# Patient Record
Sex: FEMALE | Race: WHITE
[De-identification: names, ages, dates, MRNs, and addresses within clinical notes are randomized per-mention and may not be internally consistent; named-entity substitution may affect disease eponyms.]

---

## 2018-05-07 ENCOUNTER — HOSPITAL ENCOUNTER (EMERGENCY)
Dept: HOSPITAL 47 - EC | Age: 63
Discharge: HOME | End: 2018-05-07
Payer: COMMERCIAL

## 2018-05-07 VITALS
SYSTOLIC BLOOD PRESSURE: 129 MMHG | RESPIRATION RATE: 18 BRPM | TEMPERATURE: 97.8 F | DIASTOLIC BLOOD PRESSURE: 70 MMHG | HEART RATE: 84 BPM

## 2018-05-07 DIAGNOSIS — M25.512: Primary | ICD-10-CM

## 2018-05-07 DIAGNOSIS — M25.552: ICD-10-CM
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
## 2018-05-07 DIAGNOSIS — Z79.899: ICD-10-CM

## 2018-05-07 DIAGNOSIS — Z79.891: ICD-10-CM

## 2018-05-07 DIAGNOSIS — Z98.890: ICD-10-CM

## 2018-05-07 PROCEDURE — 99284 EMERGENCY DEPT VISIT MOD MDM: CPT

## 2018-05-07 PROCEDURE — 72100 X-RAY EXAM L-S SPINE 2/3 VWS: CPT

## 2018-05-07 PROCEDURE — 73502 X-RAY EXAM HIP UNI 2-3 VIEWS: CPT
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
## 2019-11-05 ENCOUNTER — HOSPITAL ENCOUNTER (OUTPATIENT)
Dept: HOSPITAL 47 - WWCWWP | Age: 64
Discharge: HOME | End: 2019-11-05
Attending: OBSTETRICS & GYNECOLOGY
Payer: COMMERCIAL

## 2019-11-05 VITALS
DIASTOLIC BLOOD PRESSURE: 94 MMHG | SYSTOLIC BLOOD PRESSURE: 145 MMHG | TEMPERATURE: 98 F | RESPIRATION RATE: 18 BRPM | HEART RATE: 88 BPM

## 2019-11-05 VITALS — BODY MASS INDEX: 44.6 KG/M2

## 2019-11-05 DIAGNOSIS — Z12.31: Primary | ICD-10-CM

## 2019-11-05 PROCEDURE — 77067 SCR MAMMO BI INCL CAD: CPT

## 2019-11-05 PROCEDURE — 77063 BREAST TOMOSYNTHESIS BI: CPT

## 2019-11-05 NOTE — P.HPOB
History of Present Illness


H&P Date: 19


Chief Complaint: The patient is here for her routine gynecologic exam and ma

mmogram.


This is a 64-year-old  with an LMP of .  The patient is here to 

establish with this office.  The patient is without gynecologic complaints and 

denies any postmenopausal bleeding.








Review of Systems


The patient believes she has gained about 13 pounds over the past year.  She 

states her weight does fluctuate quite a bit during the year.  She states her ty

pical weight is 255 pounds.  She denies respiratory, cardiac, or GI problems.








Past Medical History


Past Medical History: Musculoskeletal Disorder, Thyroid Disorder


Additional Past Medical History / Comment(s): HERNIATED DISCS TO BACK-N/T RT LEG

AND FOOT, vertigo.  PAST GYN HISTORY: She has no history of STDs.  She used HRT 

from about 3759-8505.


History of Any Multi-Drug Resistant Organisms: None Reported


Past Surgical History: Back Surgery, Cholecystectomy, Orthopedic Surgery, To

nsillectomy


Additional Past Surgical History / Comment(s): [id number] (2nd, next after 

10yrs), D&C.


Past Anesthesia/Blood Transfusion Reactions: Previous Problems w/ Anesthesia, 

Motion Sickness, Postoperative Nausea & Vomiting (PONV)


Additional Past Anesthesia/Blood Transfusion Reaction / Comment(s): NEVER HAS 

HAD BLOOD TRANSFUSION


Past Psychological History: No Psychological Hx Reported


Smoking Status: Never smoker


Past Alcohol Use History: Rare (3 per year)


Past Drug Use History: None Reported


Additional History: She has been  since  and this is her third 

marriage.  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





- Past Family History


  ** Father


Family Medical History: Cancer, COPD, Myocardial Infarction (MI)


Additional Family Medical History / Comment(s): MULT MYELOMA-PASSED AWAY AT AGE 

84





  ** Mother


Family Medical History: Coronary Artery Disease (CAD), Diabetes Mellitus, 

Myocardial Infarction (MI)


Additional Family Medical History / Comment(s): LIVING AT AGE 81,HEART STENTS X 

5





Medications and Allergies


                                Home Medications











 Medication  Instructions  Recorded  Confirmed  Type


 


Gabapentin [Neurontin] 300 mg PO DAILY 14 History


 


Acetaminophen [Tylenol] 325 mg PO DAILY PRN 19 History


 


Ibuprofen [Motrin] 800 mg PO BID 19 History


 


Levothyroxine Sodium 100 mcg PO DAILY 19 History


 


Phentermine HCl [Adipex-P] 37.5 mg PO [id number] 19 History


 


buPROPion [Wellbutrin] 1 tab PO DAILY 19 History








                                    Allergies











Allergy/AdvReac Type Severity Reaction Status Date / Time


 


No Known Allergies Allergy   Verified 19 13:26














Exam


                                   Vital Signs











  Temp Pulse Resp BP Pulse Ox


 


 19 13:22  98.0 F  88  18  145/94  95








                                Intake and Output











 19





 22:59 06:59 14:59


 


Other:   


 


  Weight   121.563 kg











Repeat blood pressure 146/90.  Height 5 feet 5 inches, weight 268 pounds, BMI 

44.6.





This is a well-developed well-nourished heavyset white female who is alert and 

oriented times 3 in no acute distress.


HEENT: Within normal limits.


NECK: Supple without mass or thyromegaly.


CHEST AND LUNGS: Clear to auscultation.


HEART: Regular rate and rhythm.


BREASTS: Are without mass or discharge.


AXILLARY EXAM: Negative for adenopathy.


BACK: Negative for CVA tenderness.


ABDOMEN: Soft, obese, nontender, without palpable masses.


PELVIC EXAM: Normal external genitalia with mild atrophy. Cervix and vagina 

appear normal mild atrophy. There is no unusual discharge.  There is no evidence

 of prolapse.  The uterus is midposition, nongravid size and nontender. There 

are no palpable adnexal masses or tenderness.  Bimanual examination is somewhat 

limited secondary to her size.


RECTAL EXAM: Rectovaginal exam is negative for mass or tenderness and is 

negative for occult blood.


EXTREMITIES: Nontender.





IMPRESSION: 


1.  64-year-old menopausal female with normal gynecologic exam.


2.  Elevated blood pressure





PLAN: 


1.  Pap smear was performed.


2.  Self breast awareness was discussed with the patient.


3.  Screening mammogram will be done today.


4.  Osteoporosis prevention was discussed.  I have stressed the importance of 

adequate calcium, vitamin D and regular exercise.  Recommended amounts of 

calcium and vitamin D were also discussed.


5.  I have recommended that she check her own blood pressures on a regular basis

 and follow-up with Dr. Guerrero for blood pressure elevations.


6. She was advised to return in one year for her annual well woman exam.

## 2019-11-07 NOTE — MM
Reason for exam: screening  (asymptomatic).

Last mammogram was performed 3 years and 4 months ago.



History:

Patient is postmenopausal.

Family history of breast cancer in paternal aunt at age 60.

Took estrogen for 3 years beginning at age 55.  Took progesterone for 3 years 

beginning at age 55.



Physical Findings:

A clinical breast exam by your physician is recommended on an annual basis and 

results should be correlated with mammographic findings.



MG 3D Screening Mammo W/Cad

Bilateral CC and MLO view(s) were taken.

Prior study comparison: July 8, 2016, bilateral MG 3d screening mammo w/cad.  

January 10, 2014, bilateral digital screening mammo w/CAD.

There are scattered fibroglandular densities.  There is no discrete abnormality.  

No significant changes when compared with prior studies.





ASSESSMENT: Negative, BI-RAD 1



RECOMMENDATION:

Routine screening mammogram of both breasts in 1 year.

## 2019-11-12 NOTE — P.PN
Progress Note - Text


Progress Note Date: 11/12/19


OUTPATIENT FOLLOW-UP NOTE





TEST(S)/RESULTS: test results from 11/05/2019 include negative Pap smear and 

benign mammogram.


METHOD OF NOTIFICATION: the patient was notified by phone.


PATIENT COMMENTS: the patient is happy to hear these results.


DIAGNOSIS: negative Pap smear and benign mammogram.


DISCUSSION: The patient states she had a bone density test done which was normal

in 2015.  I have recommended that she repeat the bone density testing in 2020.


PLAN: the patient is to return in one year for her annual well woman exam.  Bone

density test in 1 year.

## 2020-12-05 ENCOUNTER — HOSPITAL ENCOUNTER (INPATIENT)
Dept: HOSPITAL 47 - EC | Age: 65
LOS: 16 days | DRG: 208 | End: 2020-12-21
Attending: INTERNAL MEDICINE | Admitting: INTERNAL MEDICINE
Payer: COMMERCIAL

## 2020-12-05 VITALS — BODY MASS INDEX: 41.5 KG/M2

## 2020-12-05 DIAGNOSIS — E03.9: ICD-10-CM

## 2020-12-05 DIAGNOSIS — M54.9: ICD-10-CM

## 2020-12-05 DIAGNOSIS — I46.9: ICD-10-CM

## 2020-12-05 DIAGNOSIS — Z83.3: ICD-10-CM

## 2020-12-05 DIAGNOSIS — E66.01: ICD-10-CM

## 2020-12-05 DIAGNOSIS — J15.6: ICD-10-CM

## 2020-12-05 DIAGNOSIS — J80: ICD-10-CM

## 2020-12-05 DIAGNOSIS — I27.29: ICD-10-CM

## 2020-12-05 DIAGNOSIS — Z51.5: ICD-10-CM

## 2020-12-05 DIAGNOSIS — F32.9: ICD-10-CM

## 2020-12-05 DIAGNOSIS — Z82.5: ICD-10-CM

## 2020-12-05 DIAGNOSIS — Z98.890: ICD-10-CM

## 2020-12-05 DIAGNOSIS — G89.29: ICD-10-CM

## 2020-12-05 DIAGNOSIS — Z80.7: ICD-10-CM

## 2020-12-05 DIAGNOSIS — I95.9: ICD-10-CM

## 2020-12-05 DIAGNOSIS — Z90.89: ICD-10-CM

## 2020-12-05 DIAGNOSIS — J12.89: ICD-10-CM

## 2020-12-05 DIAGNOSIS — Z79.890: ICD-10-CM

## 2020-12-05 DIAGNOSIS — Z79.899: ICD-10-CM

## 2020-12-05 DIAGNOSIS — M51.26: ICD-10-CM

## 2020-12-05 DIAGNOSIS — Z82.49: ICD-10-CM

## 2020-12-05 DIAGNOSIS — Z90.49: ICD-10-CM

## 2020-12-05 DIAGNOSIS — U07.1: Primary | ICD-10-CM

## 2020-12-05 LAB
ALBUMIN SERPL-MCNC: 3.5 G/DL (ref 3.5–5)
ALP SERPL-CCNC: 83 U/L (ref 38–126)
ALT SERPL-CCNC: 65 U/L (ref 4–34)
ANION GAP SERPL CALC-SCNC: 5 MMOL/L
APTT BLD: 24.3 SEC (ref 22–30)
AST SERPL-CCNC: 76 U/L (ref 14–36)
BASOPHILS # BLD AUTO: 0.1 K/UL (ref 0–0.2)
BASOPHILS NFR BLD AUTO: 1 %
BUN SERPL-SCNC: 20 MG/DL (ref 7–17)
CALCIUM SPEC-MCNC: 8.7 MG/DL (ref 8.4–10.2)
CHLORIDE SERPL-SCNC: 102 MMOL/L (ref 98–107)
CO2 SERPL-SCNC: 28 MMOL/L (ref 22–30)
D DIMER PPP FEU-MCNC: 0.38 MG/L FEU (ref ?–0.6)
EOSINOPHIL # BLD AUTO: 0.1 K/UL (ref 0–0.7)
EOSINOPHIL NFR BLD AUTO: 1 %
ERYTHROCYTE [DISTWIDTH] IN BLOOD BY AUTOMATED COUNT: 4.96 M/UL (ref 3.8–5.4)
ERYTHROCYTE [DISTWIDTH] IN BLOOD: 13.3 % (ref 11.5–15.5)
FERRITIN SERPL-MCNC: 360.3 NG/ML (ref 10–291)
GLUCOSE SERPL-MCNC: 166 MG/DL (ref 74–99)
HCT VFR BLD AUTO: 45.2 % (ref 34–46)
HGB BLD-MCNC: 15.1 GM/DL (ref 11.4–16)
INR PPP: 0.9 (ref ?–1.2)
LDH SPEC-CCNC: 1038 U/L (ref 313–618)
LYMPHOCYTES # SPEC AUTO: 1.4 K/UL (ref 1–4.8)
LYMPHOCYTES NFR SPEC AUTO: 11 %
MAGNESIUM SPEC-SCNC: 2.1 MG/DL (ref 1.6–2.3)
MCH RBC QN AUTO: 30.4 PG (ref 25–35)
MCHC RBC AUTO-ENTMCNC: 33.4 G/DL (ref 31–37)
MCV RBC AUTO: 91 FL (ref 80–100)
MONOCYTES # BLD AUTO: 0.3 K/UL (ref 0–1)
MONOCYTES NFR BLD AUTO: 3 %
NEUTROPHILS # BLD AUTO: 10.6 K/UL (ref 1.3–7.7)
NEUTROPHILS NFR BLD AUTO: 84 %
PLATELET # BLD AUTO: 472 K/UL (ref 150–450)
POTASSIUM SERPL-SCNC: 4.6 MMOL/L (ref 3.5–5.1)
PROT SERPL-MCNC: 7 G/DL (ref 6.3–8.2)
PT BLD: 9.3 SEC (ref 9–12)
SODIUM SERPL-SCNC: 135 MMOL/L (ref 137–145)
WBC # BLD AUTO: 12.6 K/UL (ref 3.8–10.6)

## 2020-12-05 PROCEDURE — 85379 FIBRIN DEGRADATION QUANT: CPT

## 2020-12-05 PROCEDURE — 84145 PROCALCITONIN (PCT): CPT

## 2020-12-05 PROCEDURE — 36415 COLL VENOUS BLD VENIPUNCTURE: CPT

## 2020-12-05 PROCEDURE — 96375 TX/PRO/DX INJ NEW DRUG ADDON: CPT

## 2020-12-05 PROCEDURE — 96361 HYDRATE IV INFUSION ADD-ON: CPT

## 2020-12-05 PROCEDURE — 99285 EMERGENCY DEPT VISIT HI MDM: CPT

## 2020-12-05 PROCEDURE — 36600 WITHDRAWAL OF ARTERIAL BLOOD: CPT

## 2020-12-05 PROCEDURE — 94640 AIRWAY INHALATION TREATMENT: CPT

## 2020-12-05 PROCEDURE — 86850 RBC ANTIBODY SCREEN: CPT

## 2020-12-05 PROCEDURE — 85610 PROTHROMBIN TIME: CPT

## 2020-12-05 PROCEDURE — 83615 LACTATE (LD) (LDH) ENZYME: CPT

## 2020-12-05 PROCEDURE — 86900 BLOOD TYPING SEROLOGIC ABO: CPT

## 2020-12-05 PROCEDURE — 86901 BLOOD TYPING SEROLOGIC RH(D): CPT

## 2020-12-05 PROCEDURE — 85730 THROMBOPLASTIN TIME PARTIAL: CPT

## 2020-12-05 PROCEDURE — 96372 THER/PROPH/DIAG INJ SC/IM: CPT

## 2020-12-05 PROCEDURE — 96376 TX/PRO/DX INJ SAME DRUG ADON: CPT

## 2020-12-05 PROCEDURE — 82728 ASSAY OF FERRITIN: CPT

## 2020-12-05 PROCEDURE — 82805 BLOOD GASES W/O2 SATURATION: CPT

## 2020-12-05 PROCEDURE — 87635 SARS-COV-2 COVID-19 AMP PRB: CPT

## 2020-12-05 PROCEDURE — 85025 COMPLETE CBC W/AUTO DIFF WBC: CPT

## 2020-12-05 PROCEDURE — 93005 ELECTROCARDIOGRAM TRACING: CPT

## 2020-12-05 PROCEDURE — 83605 ASSAY OF LACTIC ACID: CPT

## 2020-12-05 PROCEDURE — 94760 N-INVAS EAR/PLS OXIMETRY 1: CPT

## 2020-12-05 PROCEDURE — 94003 VENT MGMT INPAT SUBQ DAY: CPT

## 2020-12-05 PROCEDURE — 71045 X-RAY EXAM CHEST 1 VIEW: CPT

## 2020-12-05 PROCEDURE — 87040 BLOOD CULTURE FOR BACTERIA: CPT

## 2020-12-05 PROCEDURE — 96374 THER/PROPH/DIAG INJ IV PUSH: CPT

## 2020-12-05 PROCEDURE — 94660 CPAP INITIATION&MGMT: CPT

## 2020-12-05 PROCEDURE — 80053 COMPREHEN METABOLIC PANEL: CPT

## 2020-12-05 PROCEDURE — 86140 C-REACTIVE PROTEIN: CPT

## 2020-12-05 PROCEDURE — 83735 ASSAY OF MAGNESIUM: CPT

## 2020-12-05 RX ADMIN — CEFAZOLIN SCH MLS/HR: 330 INJECTION, POWDER, FOR SOLUTION INTRAMUSCULAR; INTRAVENOUS at 19:11

## 2020-12-05 NOTE — XR
EXAMINATION TYPE: XR chest 1V portable

 

DATE OF EXAM: 12/5/2020

 

COMPARISON: 10/30/2015

 

HISTORY: Pneumonia. Short of breath.

 

TECHNIQUE:

 

FINDINGS: There is some interstitial infiltrate in both lungs with some coalescent density left lung 
base. Heart size is normal. There are no hilar masses. Mediastinum is normal. There is no pleural eff
usion.

 

IMPRESSION: There is new bilateral interstitial pneumonia compared to old exam.

## 2020-12-05 NOTE — ED
General Adult HPI





- General


Chief complaint: Shortness of Breath


Stated complaint: SOB


Time Seen by Provider: 12/05/20 16:55


Source: patient, RN notes reviewed, old records reviewed


Mode of arrival: wheelchair


Limitations: no limitations





- History of Present Illness


Initial comments: 





65-year-old female presents emergency department today with complaints of 

weakness and shortness of breath.  Patient reports that her entire family has 

cold that infection however her test was negative.  She states that she seems to

be the sickest with worsening difficulty breathing.  Patient has a at-home pulse

oximeter reportedly was 78 and one time today.  She went to the emergency 

department with a pulse ox of 85% on room air.  She reports that she has history

of bronchitis but is a nonsmoker.   Patient states that she has been seen by her

primary care doctor and prescribed azithromycin, hydroxychloroquine as well as 

steroids.  She reports despite taking his medications or symptoms seem to be 

worsening shortness of breath. 





- Related Data


                                Home Medications











 Medication  Instructions  Recorded  Confirmed


 


Ibuprofen [Motrin] 800 mg PO TID 11/05/19 12/05/20


 


Levothyroxine Sodium 100 mcg PO DAILY 11/05/19 12/05/20


 


Phentermine HCl [Adipex-P] 37.5 mg PO AC-BRKFST 11/05/19 12/05/20


 


Dexamethasone [Decadron] 6 mg PO DAILY 12/05/20 12/05/20


 


Hydroxychloroquine Sulfate See Taper PO AS DIRECTED 12/05/20 12/05/20





[Plaquenil]   


 


Neomycin/Polymyxin B/Dexametha 1 drop LEFT EYE QID 12/05/20 12/05/20





[Fgzgfd-Skgni-Opdeyxyg Eye Drop]   


 


buPROPion XL [Wellbutrin Xl] 150 mg PO DAILY 12/05/20 12/05/20











                                    Allergies











Allergy/AdvReac Type Severity Reaction Status Date / Time


 


No Known Allergies Allergy   Verified 12/05/20 18:37














Review of Systems


ROS Statement: 


Those systems with pertinent positive or pertinent negative responses have been 

documented in the HPI.





ROS Other: All systems not noted in ROS Statement are negative.





Past Medical History


Past Medical History: Musculoskeletal Disorder, Thyroid Disorder


Additional Past Medical History / Comment(s): HERNIATED DISCS TO BACK-N/T RT LEG

AND FOOT, vertigo.  PAST GYN HISTORY: She has no history of STDs.  She used HRT 

from about 0440-7879.


History of Any Multi-Drug Resistant Organisms: None Reported


Past Surgical History: Back Surgery, Cholecystectomy, Orthopedic Surgery, 

Tonsillectomy


Additional Past Surgical History / Comment(s): [id number] (2nd, next after 

10yrs), D&C.


Past Anesthesia/Blood Transfusion Reactions: Previous Problems w/ Anesthesia, 

Motion Sickness, Postoperative Nausea & Vomiting (PONV)


Additional Past Anesthesia/Blood Transfusion Reaction / Comment(s): NEVER HAS 

HAD BLOOD TRANSFUSION


Past Psychological History: No Psychological Hx Reported


Smoking Status: Never smoker


Past Alcohol Use History: Rare


Past Drug Use History: None Reported





- Past Family History


  ** Father


Family Medical History: Cancer, COPD, Myocardial Infarction (MI)


Additional Family Medical History / Comment(s): MULT MYELOMA-PASSED AWAY AT AGE 

84





  ** Mother


Family Medical History: Coronary Artery Disease (CAD), Diabetes Mellitus, 

Myocardial Infarction (MI)


Additional Family Medical History / Comment(s): LIVING AT AGE 81,HEART STENTS X 

5





General Exam





- General Exam Comments


Initial Comments: 





Is a 65-year-old female.  Patient appears in moderate discomfort  saturation was

85% on room air.  She is placed on 4 L of oxygen.


Limitations: no limitations


General appearance: alert, in no apparent distress


Head exam: Present: atraumatic, normocephalic, normal inspection


Eye exam: Present: normal appearance, PERRL, EOMI.  Absent: scleral icterus, 

conjunctival injection, periorbital swelling


ENT exam: Present: normal exam, mucous membranes moist


Neck exam: Present: normal inspection.  Absent: tenderness, meningismus, 

lymphadenopathy


Respiratory exam: Present: normal lung sounds bilaterally, wheezes.  Absent: 

respiratory distress, rales, rhonchi, stridor


Cardiovascular Exam: Present: regular rate, normal rhythm, normal heart sounds. 

Absent: systolic murmur, diastolic murmur, rubs, gallop, clicks


GI/Abdominal exam: Present: soft, normal bowel sounds.  Absent: distended, 

tenderness, guarding, rebound, rigid


Extremities exam: Present: normal inspection, full ROM, normal capillary refill.

 Absent: tenderness, pedal edema, joint swelling, calf tenderness


Back exam: Present: normal inspection


Neurological exam: Present: alert, oriented X3, CN II-XII intact


Psychiatric exam: Present: normal affect, normal mood


Skin exam: Present: warm, dry, intact, normal color.  Absent: rash





Course


                                   Vital Signs











  12/05/20 12/05/20





  16:47 19:38


 


Temperature 98.4 F 98.1 F


 


Pulse Rate 84 67


 


Respiratory 20 22





Rate  


 


Blood Pressure 129/83 138/79


 


O2 Sat by Pulse 88 L 95





Oximetry  














EKG Findings





- EKG Comments:


EKG Findings:: EKG performed at 1823 shows normal sinus rhythm.  Possible atrial

enlargement.  Left ventricular hypertrophy.  Ventricularly of 71 bpm.  Was 1:30 

milliseconds.  QRS duration is 84 ms.  QT QTc is 400/434 ms.





Medical Decision Making





- Medical Decision Making


65-year-old female presents emergency room today with fatigue shortness of b

reath.  She is not hypoxic with oxygen saturation 85% on room air.  She is 

placed on 4 L of oxygen has some improvement.  Chest x-ray shows bilateral 

pneumonia.  Her family tested positive for cocaine.  However her initial test 

was negative.  She will be retested today.  Patient's labs are otherwise 

reviewed d-dimer was normal.  Plantar markers are elevated.  Patient's case was 

discussed with Dr. Smith whom discussed the case with Dr. clayton to 

agrees for admission.  Patient will be admitted at this time with IV fluids and 

oxygen supplementation.  She was started on steroids and Rocephin.








- Lab Data


Result diagrams: 


                                 12/05/20 18:30





                                 12/05/20 18:30


                                   Lab Results











  12/05/20 12/05/20 12/05/20 Range/Units





  18:30 18:30 18:30 


 


WBC  12.6 H    (3.8-10.6)  k/uL


 


RBC  4.96    (3.80-5.40)  m/uL


 


Hgb  15.1    (11.4-16.0)  gm/dL


 


Hct  45.2    (34.0-46.0)  %


 


MCV  91.0    (80.0-100.0)  fL


 


MCH  30.4    (25.0-35.0)  pg


 


MCHC  33.4    (31.0-37.0)  g/dL


 


RDW  13.3    (11.5-15.5)  %


 


Plt Count  472 H    (150-450)  k/uL


 


MPV  6.3    


 


Neutrophils %  84    %


 


Lymphocytes %  11    %


 


Monocytes %  3    %


 


Eosinophils %  1    %


 


Basophils %  1    %


 


Neutrophils #  10.6 H    (1.3-7.7)  k/uL


 


Lymphocytes #  1.4    (1.0-4.8)  k/uL


 


Monocytes #  0.3    (0-1.0)  k/uL


 


Eosinophils #  0.1    (0-0.7)  k/uL


 


Basophils #  0.1    (0-0.2)  k/uL


 


PT   9.3   (9.0-12.0)  sec


 


INR   0.9   (<1.2)  


 


APTT   24.3   (22.0-30.0)  sec


 


D-Dimer   0.38   (<0.60)  mg/L FEU


 


Sodium    135 L  (137-145)  mmol/L


 


Potassium    4.6  (3.5-5.1)  mmol/L


 


Chloride    102  ()  mmol/L


 


Carbon Dioxide    28  (22-30)  mmol/L


 


Anion Gap    5  mmol/L


 


BUN    20 H  (7-17)  mg/dL


 


Creatinine    0.50 L  (0.52-1.04)  mg/dL


 


Est GFR (CKD-EPI)AfAm    >90  (>60 ml/min/1.73 sqM)  


 


Est GFR (CKD-EPI)NonAf    >90  (>60 ml/min/1.73 sqM)  


 


Glucose    166 H  (74-99)  mg/dL


 


Plasma Lactic Acid Nikco     (0.7-2.0)  mmol/L


 


Calcium    8.7  (8.4-10.2)  mg/dL


 


Magnesium    2.1  (1.6-2.3)  mg/dL


 


Total Bilirubin    0.4  (0.2-1.3)  mg/dL


 


AST    76 H  (14-36)  U/L


 


ALT    65 H  (4-34)  U/L


 


Alkaline Phosphatase    83  ()  U/L


 


Lactate Dehydrogenase    1038 H  (313-618)  U/L


 


C-Reactive Protein    63.0 H  (<10.0)  mg/L


 


Total Protein    7.0  (6.3-8.2)  g/dL


 


Albumin    3.5  (3.5-5.0)  g/dL














  12/05/20 Range/Units





  18:30 


 


WBC   (3.8-10.6)  k/uL


 


RBC   (3.80-5.40)  m/uL


 


Hgb   (11.4-16.0)  gm/dL


 


Hct   (34.0-46.0)  %


 


MCV   (80.0-100.0)  fL


 


MCH   (25.0-35.0)  pg


 


MCHC   (31.0-37.0)  g/dL


 


RDW   (11.5-15.5)  %


 


Plt Count   (150-450)  k/uL


 


MPV   


 


Neutrophils %   %


 


Lymphocytes %   %


 


Monocytes %   %


 


Eosinophils %   %


 


Basophils %   %


 


Neutrophils #   (1.3-7.7)  k/uL


 


Lymphocytes #   (1.0-4.8)  k/uL


 


Monocytes #   (0-1.0)  k/uL


 


Eosinophils #   (0-0.7)  k/uL


 


Basophils #   (0-0.2)  k/uL


 


PT   (9.0-12.0)  sec


 


INR   (<1.2)  


 


APTT   (22.0-30.0)  sec


 


D-Dimer   (<0.60)  mg/L FEU


 


Sodium   (137-145)  mmol/L


 


Potassium   (3.5-5.1)  mmol/L


 


Chloride   ()  mmol/L


 


Carbon Dioxide   (22-30)  mmol/L


 


Anion Gap   mmol/L


 


BUN   (7-17)  mg/dL


 


Creatinine   (0.52-1.04)  mg/dL


 


Est GFR (CKD-EPI)AfAm   (>60 ml/min/1.73 sqM)  


 


Est GFR (CKD-EPI)NonAf   (>60 ml/min/1.73 sqM)  


 


Glucose   (74-99)  mg/dL


 


Plasma Lactic Acid Nicko  1.3  (0.7-2.0)  mmol/L


 


Calcium   (8.4-10.2)  mg/dL


 


Magnesium   (1.6-2.3)  mg/dL


 


Total Bilirubin   (0.2-1.3)  mg/dL


 


AST   (14-36)  U/L


 


ALT   (4-34)  U/L


 


Alkaline Phosphatase   ()  U/L


 


Lactate Dehydrogenase   (313-618)  U/L


 


C-Reactive Protein   (<10.0)  mg/L


 


Total Protein   (6.3-8.2)  g/dL


 


Albumin   (3.5-5.0)  g/dL














- Radiology Data


Radiology results: report reviewed


There is no new bilateral interstitial pneumonia compared old exam.





Disposition


Clinical Impression: 


 COVID-19, Pneumonia, Hypoxia





Disposition: ADMITTED AS IP TO THIS [facility]


Condition: Stable


Is patient prescribed a controlled substance at d/c from ED?: No


Referrals: 


Stan Zayas MD [Primary Care Provider] - 1-2 days


Time of Disposition: 19:44

## 2020-12-06 RX ADMIN — GABAPENTIN SCH MG: 300 CAPSULE ORAL at 12:13

## 2020-12-06 RX ADMIN — ENOXAPARIN SODIUM SCH MG: 40 INJECTION SUBCUTANEOUS at 12:13

## 2020-12-06 RX ADMIN — CEFAZOLIN SCH MLS/HR: 330 INJECTION, POWDER, FOR SOLUTION INTRAMUSCULAR; INTRAVENOUS at 23:33

## 2020-12-06 RX ADMIN — BUPROPION HYDROCHLORIDE SCH MG: 150 TABLET, FILM COATED, EXTENDED RELEASE ORAL at 08:25

## 2020-12-06 RX ADMIN — FAMOTIDINE SCH MG: 20 TABLET, FILM COATED ORAL at 13:45

## 2020-12-06 RX ADMIN — CEFAZOLIN SCH MLS/HR: 330 INJECTION, POWDER, FOR SOLUTION INTRAMUSCULAR; INTRAVENOUS at 17:24

## 2020-12-06 RX ADMIN — NEOMYCIN SULFATE, POLYMYXIN B SULFATE AND DEXAMETHASONE SCH DROPS: 3.5; 10000; 1 SUSPENSION/ DROPS OPHTHALMIC at 08:25

## 2020-12-06 RX ADMIN — OXYCODONE HYDROCHLORIDE AND ACETAMINOPHEN SCH MG: 500 TABLET ORAL at 13:45

## 2020-12-06 RX ADMIN — FAMOTIDINE SCH MG: 20 TABLET, FILM COATED ORAL at 20:29

## 2020-12-06 RX ADMIN — GABAPENTIN SCH MG: 300 CAPSULE ORAL at 20:28

## 2020-12-06 RX ADMIN — ACETAMINOPHEN PRN MG: 325 TABLET, FILM COATED ORAL at 17:23

## 2020-12-06 RX ADMIN — Medication SCH MG: at 13:45

## 2020-12-06 RX ADMIN — METHYLPREDNISOLONE SODIUM SUCCINATE SCH MG: 40 INJECTION, POWDER, FOR SOLUTION INTRAMUSCULAR; INTRAVENOUS at 23:26

## 2020-12-06 RX ADMIN — Medication SCH UNIT: at 13:45

## 2020-12-06 RX ADMIN — LEVOTHYROXINE SODIUM SCH MCG: 100 TABLET ORAL at 05:44

## 2020-12-06 RX ADMIN — NEOMYCIN SULFATE, POLYMYXIN B SULFATE AND DEXAMETHASONE SCH: 3.5; 10000; 1 SUSPENSION/ DROPS OPHTHALMIC at 02:43

## 2020-12-06 RX ADMIN — METHYLPREDNISOLONE SODIUM SUCCINATE SCH MG: 40 INJECTION, POWDER, FOR SOLUTION INTRAMUSCULAR; INTRAVENOUS at 17:22

## 2020-12-06 RX ADMIN — METHYLPREDNISOLONE SODIUM SUCCINATE SCH MG: 40 INJECTION, POWDER, FOR SOLUTION INTRAMUSCULAR; INTRAVENOUS at 08:25

## 2020-12-06 RX ADMIN — CEFAZOLIN SCH MLS/HR: 330 INJECTION, POWDER, FOR SOLUTION INTRAMUSCULAR; INTRAVENOUS at 05:44

## 2020-12-06 RX ADMIN — NEOMYCIN SULFATE, POLYMYXIN B SULFATE AND DEXAMETHASONE SCH: 3.5; 10000; 1 SUSPENSION/ DROPS OPHTHALMIC at 08:27

## 2020-12-06 RX ADMIN — METHYLPREDNISOLONE SODIUM SUCCINATE SCH MG: 40 INJECTION, POWDER, FOR SOLUTION INTRAMUSCULAR; INTRAVENOUS at 03:06

## 2020-12-06 NOTE — P.HPIM
History of Present Illness


H&P Date: 12/06/20


Chief Complaint: Cough





History of presenting complaint:


This is a 65-year-old patient of Dr. williamson.  Chronic stable medical 

conditions include hypothyroid, herniated disc.  She presented to the ER with 

complaints of weakness and shortness of breath.  Her entire family has the cold 

and a COVID test was negative.  Her pulse ox syndrome and was 78%.  And in the 

ER it was 85%.  She is a nonsmoker.  Her primary care giver azithromycin, 

hydrocodone [place] and steroids.  She took these but the symptoms are not 

getting any better.  Symptoms have been going on for about 10 days.  She is also

had some fever and chills.  Decreased appetite.  Decrease in taste and smell.  

Had some headaches.  Has some diarrhea.  Also notices beige sputum.  Feels a 

chest tightness for deep breath.





Review of systems:


GEN.:  As above


EYES: None


HEENT: None


NECK: None


RESPIRATORY: [As above


CARDIOVASCULAR: None


GASTROINTESTINAL: [As above


GENITOURINARY: None


MUSCULOSKELETAL: None


LYMPHATICS: None


HEMATOLOGICAL: None  


PSYCHIATRY: None


NEUROLOGICAL: None





Past medical history to include:


Hypothyroid, depression, herniated disc





Social history:


.  Does not smoke or drink alcohol.  Currently works as an insurance 

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





Physical examination:


VITAL SIGNS: 98.4, 84, 20, 129/83, 88% on room air, did drop down to 90% on 6 L


GENERAL: BMI 41.6, sitting up, tired.  


PSYCH: [Alert and oriented x3;  mood  and affect celena]l.  


NEUROLOGICAL: Cranial nerves grossly intact.  Moving all 4 limbs


Rest of the exam as per nursing in ER.





INVESTIGATIONS, reviewed in the clinical context:


White count 12.6 hemoglobin 15.1 and platelets 472 increased neutrophils


d-dimer 0.38 potassium 4.6 bun 20 creatinine 0.50


CRP 63


Coronavirus PCF-detected


EKG tracing personally reviewed by me-no sinus rhythm


Chest x-ray film personally reviewed by me-bilateral scattered infiltrates





Assessment:


-Bilateral COVID 19 pneumonia


-Possible gram-negative pneumonia, patient has beige sputum and left shift on 

CBC


-Acute hypoxic respiratory failure from above


-Hypothyroid


-Morbid obesity BMI 41.6





Plan:


Oxygen being supplemented.  Consult pulmonary and ID.  Patient is put on IV 

Solu-Medrol.  Supplementations including zinc Pepcid vitamin C vitamin D been 

given.  Subcu Lovenox.  Care was discussed with the patient.  Questions were 

answered.





Past Medical History


Past Medical History: Musculoskeletal Disorder, Thyroid Disorder


Additional Past Medical History / Comment(s): HERNIATED DISCS TO BACK-N/T RT LEG

AND FOOT, vertigo.  PAST GYN HISTORY: She has no history of STDs.  She used HRT 

from about 9205-3528.


History of Any Multi-Drug Resistant Organisms: None Reported


Past Surgical History: Back Surgery, Cholecystectomy, Orthopedic Surgery, 

Tonsillectomy


Additional Past Surgical History / Comment(s): [id number] (2nd, next after 

10yrs), D&C.


Past Anesthesia/Blood Transfusion Reactions: Previous Problems w/ Anesthesia, 

Motion Sickness, Postoperative Nausea & Vomiting (PONV)


Additional Past Anesthesia/Blood Transfusion Reaction / Comment(s): NEVER HAS 

HAD BLOOD TRANSFUSION


Past Psychological History: No Psychological Hx Reported


Smoking Status: Never smoker


Past Alcohol Use History: Rare


Past Drug Use History: None Reported





- Past Family History


  ** Father


Family Medical History: Cancer, COPD, Myocardial Infarction (MI)


Additional Family Medical History / Comment(s): MULT MYELOMA-PASSED AWAY AT AGE 

84





  ** Mother


Family Medical History: Coronary Artery Disease (CAD), Diabetes Mellitus, 

Myocardial Infarction (MI)


Additional Family Medical History / Comment(s): LIVING AT AGE 81,HEART STENTS X 

5





Medications and Allergies


                                Home Medications











 Medication  Instructions  Recorded  Confirmed  Type


 


Ibuprofen [Motrin] 800 mg PO TID 11/05/19 12/05/20 History


 


Levothyroxine Sodium 100 mcg PO DAILY 11/05/19 12/05/20 History


 


Phentermine HCl [Adipex-P] 37.5 mg PO AC-BRKFST 11/05/19 12/05/20 History


 


Dexamethasone [Decadron] 6 mg PO DAILY 12/05/20 12/05/20 History


 


Hydroxychloroquine Sulfate See Taper PO AS DIRECTED 12/05/20 12/05/20 History





[Plaquenil]    


 


buPROPion XL [Wellbutrin Xl] 150 mg PO DAILY 12/05/20 12/05/20 History


 


Gabapentin 300 mg PO BID 12/06/20 12/06/20 History


 


guaiFENesin [Mucinex] 1,200 mg PO BID 12/06/20 12/06/20 History








                                    Allergies











Allergy/AdvReac Type Severity Reaction Status Date / Time


 


No Known Allergies Allergy   Verified 12/05/20 18:37














Physical Exam


Vitals: 


                                   Vital Signs











  Temp Pulse Pulse Resp BP BP Pulse Ox


 


 12/06/20 05:36  97.7 F   67  18   154/88  90 L


 


 12/06/20 05:30     20   


 


 12/06/20 04:15    67  19   154/88  90 L


 


 12/06/20 00:10   70   20  128/76   96


 


 12/05/20 19:38  98.1 F  67   22  138/79   95


 


 12/05/20 16:47  98.4 F  84   20  129/83   88 L








                                Intake and Output











 12/05/20 12/06/20 12/06/20





 22:59 06:59 14:59


 


Intake Total  200 


 


Balance  200 


 


Intake:   


 


  Intake, IV Titration  200 





  Amount   


 


    Sodium Chloride 0.9% 1,  200 





    000 ml @ 100 mls/hr IV .   





    Q10H [facility] Rx#:[id number]   


 


Other:   


 


  # Voids  1 


 


  # Bowel Movements  0 


 


  Weight 113.398 kg 113.398 kg 














Results


CBC & Chem 7: 


                                 12/05/20 18:30





                                 12/05/20 18:30


Labs: 


                  Abnormal Lab Results - Last 24 Hours (Table)











  12/05/20 12/05/20 12/05/20 Range/Units





  18:30 18:30 19:37 


 


WBC  12.6 H    (3.8-10.6)  k/uL


 


Plt Count  472 H    (150-450)  k/uL


 


Neutrophils #  10.6 H    (1.3-7.7)  k/uL


 


Sodium   135 L   (137-145)  mmol/L


 


BUN   20 H   (7-17)  mg/dL


 


Creatinine   0.50 L   (0.52-1.04)  mg/dL


 


Glucose   166 H   (74-99)  mg/dL


 


Ferritin   360.3 H   (10.0-291.0)  ng/mL


 


AST   76 H   (14-36)  U/L


 


ALT   65 H   (4-34)  U/L


 


Lactate Dehydrogenase   1038 H   (313-618)  U/L


 


C-Reactive Protein   63.0 H   (<10.0)  mg/L


 


Coronavirus (PCR)    Detected A  (Not Detectd)  














Thrombosis Risk Factor Assmnt





- Choose All That Apply


Any of the Below Risk Factors Present?: Yes


Each Factor Represents 1 point: Abnormal pulmonary function (COPD), Heart 

failure (<1month), Obesity (BMI >25)


Each Risk Factor Represents 2 Points: Age 61-74 years


Thrombosis Risk Factor Assessment Total Risk Factor Score: 5


Thrombosis Risk Factor Assessment Level: High Risk

## 2020-12-07 PROCEDURE — XW033E5 INTRODUCTION OF REMDESIVIR ANTI-INFECTIVE INTO PERIPHERAL VEIN, PERCUTANEOUS APPROACH, NEW TECHNOLOGY GROUP 5: ICD-10-PCS

## 2020-12-07 PROCEDURE — XW13325 TRANSFUSION OF CONVALESCENT PLASMA (NONAUTOLOGOUS) INTO PERIPHERAL VEIN, PERCUTANEOUS APPROACH, NEW TECHNOLOGY GROUP 5: ICD-10-PCS

## 2020-12-07 RX ADMIN — LEVOTHYROXINE SODIUM SCH MCG: 100 TABLET ORAL at 05:00

## 2020-12-07 RX ADMIN — OXYCODONE HYDROCHLORIDE AND ACETAMINOPHEN SCH MG: 500 TABLET ORAL at 08:04

## 2020-12-07 RX ADMIN — ACETAMINOPHEN PRN MG: 325 TABLET, FILM COATED ORAL at 20:10

## 2020-12-07 RX ADMIN — Medication SCH MG: at 08:04

## 2020-12-07 RX ADMIN — FAMOTIDINE SCH MG: 20 TABLET, FILM COATED ORAL at 20:07

## 2020-12-07 RX ADMIN — BUPROPION HYDROCHLORIDE SCH MG: 150 TABLET, FILM COATED, EXTENDED RELEASE ORAL at 08:04

## 2020-12-07 RX ADMIN — ACETAMINOPHEN PRN MG: 325 TABLET, FILM COATED ORAL at 05:00

## 2020-12-07 RX ADMIN — CEFAZOLIN SCH: 330 INJECTION, POWDER, FOR SOLUTION INTRAMUSCULAR; INTRAVENOUS at 10:47

## 2020-12-07 RX ADMIN — CEFAZOLIN SCH MLS/HR: 330 INJECTION, POWDER, FOR SOLUTION INTRAMUSCULAR; INTRAVENOUS at 12:45

## 2020-12-07 RX ADMIN — METHYLPREDNISOLONE SODIUM SUCCINATE SCH MG: 40 INJECTION, POWDER, FOR SOLUTION INTRAMUSCULAR; INTRAVENOUS at 08:04

## 2020-12-07 RX ADMIN — ENOXAPARIN SODIUM SCH MG: 40 INJECTION SUBCUTANEOUS at 08:04

## 2020-12-07 RX ADMIN — GABAPENTIN SCH MG: 300 CAPSULE ORAL at 20:07

## 2020-12-07 RX ADMIN — Medication SCH UNIT: at 08:04

## 2020-12-07 RX ADMIN — GABAPENTIN SCH MG: 300 CAPSULE ORAL at 08:04

## 2020-12-07 RX ADMIN — FAMOTIDINE SCH MG: 20 TABLET, FILM COATED ORAL at 08:04

## 2020-12-07 RX ADMIN — ACETAMINOPHEN PRN MG: 325 TABLET, FILM COATED ORAL at 14:07

## 2020-12-07 NOTE — P.CONS
<Mary Carmen Carter A - Last Filed: 12/07/20 15:06>





History of Present Illness





- Reason for Consult


Consult date: 12/07/20





- History of Present Illness





HISTORY OF PRESENT ILLNESS


This is a 65-year-old  female had onset of symptoms 10 days ago with 

fever, cough, shortness of breath.  She states for the first 6 or 7 day she was 

feeling okay but by day #8 she started feeling terrible.  She bought pulse ox 

reader and initially she was running 90% but then dropped down to 78%.  She was 

doing virtual visits with her physician's office and was put on dexamethasone, 

is azithromycin and completed course.  On recheck, patient was placed on 

hydroxychloroquine.  She denies having any abdominal pain.  No diarrhea.  She 

complains of a cough with chest pain with coughing, dark beige sputum 

production.  She complains of chest pain with deep inspiration.  She is feeling 

improvement since admission but continues to have cough and shortness of breath.

 Pulse ox is 90% on 15 L nasal cannula, heart rate 69, blood pressure 140/81.  

She has been afebrile.  W BC 12.6.  Lymphocytes normal.  Initial d-dimer 0.38 

and repeat 0.53.  Creatinine 0.5.  Ferritin level CCCLX.3.  AST 76, ALT 65, 

alkaline phosphatase 83.  LDH 1038.  C-reactive protein 63 with repeat of 6.7.  

COVID-19 positive.  Pro-calcitonin was 0.04 and repeat 0.02.  Chest x-ray 

reveals new bilateral interstitial pneumonia.  Patient has been started on 

ceftriaxone, dexamethasone 6 mg oral daily, Lovenox 40 mg subcu daily, 

supplements.





REVIEW OF SYSTEMS


Constitutional: No fever, no chills, no night sweats.  No weight change.  No 

weakness, fatigue or lethargy.  No daytime sleepiness.


EENT: No headache.  No blurred vision or double vision, no loss of vision.  No 

loss of Hearing, no ringing in the ears, no dizziness.  No nasal drainage or 

congestion.  No epistaxis.  No sore throat.


Lungs: No shortness of breath, cough, no sputum production.  No wheezing.


Cardiovascular: No chest pain, no lower extremity edema.  No palpitations.  No 

paroxysmal nocturnal dyspnea.  No orthopnea.  No lightheadedness or dizziness.  

No syncopal episodes.


Abdominal: No abdominal pain.  No nausea, vomiting.  No diarrhea.  No 

constipation.  No bloody or tarry stools..  No loss of appetite.


Genitourinary: No dysuria, increased frequency, urgency.  No urinary retention.


Musculoskeletal: No myalgias.  No muscle weakness, no gait dysfunction, no 

frequent falls.  No back pain.  No neck pain.


Integumentary: No wounds, no lesions.  No rash or pruritus.  No unusual 

bruising.  No change in hair or nails.


Neurologic: No aphasia. No facial droop. No change in mentation. No head injury.

No headache. No paralysis. No paresthesia.


Endocrine: No abnormal blood sugars.  No weight change.   





PHYSICAL EXAMINATION


Gen: This is a morbidly obese 65-year-old  female.


HEENT: Head is atraumatic, normocephalic. Pupils equal, round. Sclerae is 

anicteric. 


NECK: Supple. No JVD. 


LUNGS: Diminished in the bilateral bases. No wheezes or rhonchi.  No intercostal

retractions.


HEART: Regular rate and rhythm. No murmur. 


ABDOMEN: Soft. Bowel sounds are present. No masses.  No tenderness.


EXTREMITIES: No pedal edema.  No calf tenderness.


NEUROLOGICAL: Patient is awake, alert and oriented x3. 





ASSESSMENT


Covid 19 pneumonia


Acute hypoxic respiratory failure


Elevated inflammatory markers





PLAN


Order Remdesivir 200 mg 1 followed by 100 mg to complete a total of 5 days


Continue ceftriaxone, dexamethasone 6 mg oral daily, Lovenox 40 mg subcu daily, 

supplements


Continue oxygen supplementation


Blood culture in progress





The above dictated assessment and findings were discussed with Dr. Garcia.  The 

impression and plan of care have been directed as dictated.  Mary Carmen Carter nurse 

practitioner acting as scribe for Dr. Garcia.








Past Medical History


Past Medical History: Musculoskeletal Disorder, Thyroid Disorder


Additional Past Medical History / Comment(s): HERNIATED DISCS TO BACK-N/T RT LEG

AND FOOT, vertigo.  PAST GYN HISTORY: She has no history of STDs.  She used HRT 

from about 2684-4273.


History of Any Multi-Drug Resistant Organisms: None Reported


Past Surgical History: Back Surgery, Cholecystectomy, Orthopedic Surgery, 

Tonsillectomy


Additional Past Surgical History / Comment(s): [id number] (2nd, next after 

10yrs), D&C.


Past Anesthesia/Blood Transfusion Reactions: Previous Problems w/ Anesthesia, 

Motion Sickness, Postoperative Nausea & Vomiting (PONV)


Additional Past Anesthesia/Blood Transfusion Reaction / Comm: NEVER HAS HAD 

BLOOD TRANSFUSION


Past Psychological History: No Psychological Hx Reported


Smoking Status: Never smoker


Past Alcohol Use History: Rare


Past Drug Use History: None Reported





- Past Family History


  ** Father


Family Medical History: Cancer, COPD, Myocardial Infarction (MI)


Additional Family Medical History / Comment(s): MAD[place] MYELOMA-PASSED AWAY AT AGE 

84





  ** Mother


Family Medical History: Coronary Artery Disease (CAD), Diabetes Mellitus, 

Myocardial Infarction (MI)


Additional Family Medical History / Comment(s): LIVING AT AGE 81,HEART STENTS X 

5





Medications and Allergies


                                Home Medications











 Medication  Instructions  Recorded  Confirmed  Type


 


Ibuprofen [Motrin] 800 mg PO TID 11/05/19 12/05/20 History


 


Levothyroxine Sodium 100 mcg PO DAILY 11/05/19 12/05/20 History


 


Phentermine HCl [Adipex-P] 37.5 mg PO AC-BRKFST 11/05/19 12/05/20 History


 


Dexamethasone [Decadron] 6 mg PO DAILY 12/05/20 12/05/20 History


 


Hydroxychloroquine Sulfate See Taper PO AS DIRECTED 12/05/20 12/05/20 History





[Plaquenil]    


 


buPROPion XL [Wellbutrin Xl] 150 mg PO DAILY 12/05/20 12/05/20 History


 


Gabapentin 300 mg PO BID 12/06/20 12/06/20 History


 


guaiFENesin [Mucinex] 1,200 mg PO BID 12/06/20 12/06/20 History








                                    Allergies











Allergy/AdvReac Type Severity Reaction Status Date / Time


 


No Known Allergies Allergy   Verified 12/05/20 18:37














Physical Exam


Vitals: 


                                   Vital Signs











  Temp Pulse Resp BP Pulse Ox


 


 12/07/20 11:20  97.8 F  69  18  148/81  90 L


 


 12/07/20 08:58      90 L


 


 12/07/20 05:52    20   96


 


 12/07/20 05:25    22   90 L


 


 12/07/20 05:20    22   78 L


 


 12/07/20 05:01    20   84 L


 


 12/07/20 04:55    20   74 L


 


 12/07/20 04:50  98.2 F  76  16  160/94  90 L


 


 12/06/20 22:55  97.9 F  68  16  166/82  88 L


 


 12/06/20 18:00      90 L


 


 12/06/20 16:45  98.4 F  76  18  132/76  90 L








                                Intake and Output











 12/06/20 12/07/20 12/07/20





 22:59 06:59 14:59


 


Other:   


 


  Voiding Method Toilet  


 


  # Voids 1 1 














Results


CBC & Chem 7: 


                                 12/05/20 18:30





                                 12/05/20 18:30


Labs: 


                  Abnormal Lab Results - Last 24 Hours (Table)











  12/07/20 Range/Units





  06:46 


 


C-Reactive Protein  6.7 H  (0.0-0.8)  mg/dL








                      Microbiology - Last 24 Hours (Table)











 12/05/20 18:30 Blood Culture - Preliminary





 Blood    No Growth after 24 hours














<Mya Garcia - Last Filed: 12/07/20 22:27>





Physical Exam


Vitals: 


                                   Vital Signs











  Temp Pulse Pulse Resp BP Pulse Ox


 


 12/07/20 18:00       92 L


 


 12/07/20 16:40  98.0 F  77   18  160/96  95


 


 12/07/20 11:20  97.8 F   69  18  148/81  90 L


 


 12/07/20 08:58       90 L


 


 12/07/20 05:52     20   96


 


 12/07/20 05:25     22   90 L


 


 12/07/20 05:20     22   78 L


 


 12/07/20 05:01     20   84 L


 


 12/07/20 04:55     20   74 L


 


 12/07/20 04:50  98.2 F   76  16  160/94  90 L


 


 12/06/20 22:55  97.9 F   68  16  166/82  88 L








                                Intake and Output











 12/07/20 12/07/20 12/07/20





 06:59 14:59 22:59


 


Other:   


 


  # Voids 1  5














Results


CBC & Chem 7: 


                                 12/05/20 18:30





                                 12/05/20 18:30


Labs: 


                  Abnormal Lab Results - Last 24 Hours (Table)











  12/07/20 Range/Units





  06:46 


 


C-Reactive Protein  6.7 H  (0.0-0.8)  mg/dL








                      Microbiology - Last 24 Hours (Table)











 12/05/20 18:30 Blood Culture - Preliminary





 Blood    No Growth after 48 hours














Assessment and Plan


Assessment: 


Patient with acute COVID-19 pneumonia in this patient with  requiring high flow 

nasal cannula oxygen and high risk of respiratory failure the patient symptoms 

started about 10 days ago however she will benefit from remdesivir  this has 

been discussed in detail with the pharmacist who has to get approval from a CMO 

for infusion of remdesivir





(1) Pneumonia due to COVID-19 virus


Current Visit: Yes   Status: Acute   Code(s): U07.1 - COVID-19; J12.89 - OTHER 

VIRAL PNEUMONIA   SNOMED Code(s): 270291233581835787


   


Plan: 


1-patient will be started on remdesivir 200 mg day 1 followed by 100 milligrams 

daily x4 more doses


2-dexamethasone 6 mg daily along with Lovenox and zinc sulfate


3-droplet isolation and respiratory support


We will follow on clinical condition and cultures to further adjust medication 

if needed


Thank you for this consultation we will follow the patient along with you





Time with Patient: Greater than 30

## 2020-12-07 NOTE — P.CNPUL
History of Present Illness


Consult date: 12/07/20


Reason for consult: pneumonia


History of present illness: 





65-year-old male patient and the patient came into the emergency department 

yesterday because of generalized weakness shortness of breath.  The patient had 

a pulse ox of 70%.  The patient is a nonsmoker.  Apparently symptoms of been 

progressively getting worse over the past 10 days. she originally got sick 

around Thanksgiving and her symptoms were waxing and waning and progressively s

he got worse and she ultimately had come to the hospital because of worsening 

shortness of breath. He had some fever and chills.  He had diminished appetite. 

He had decreased taste and smell.  The patient tested positive for COVID 19 by 

PCR and the patient is currently being treated for this pneumonia.  The patient 

was admitted because of 12.6.  The patient has a platelet count of 472.  

Preservation.  Positive normal.  D-dimer is not elevated at 0.53.  The ferritin 

level is at 360, the patient has a mild transaminitis with an AST of 76, ALP of 

65 with an LDH of 1038.  CRP is at 63 and appropriate LEVEL IS AT 0.04.  REST X-

RAY SHOWING NEW BILATERAL INTERSTITIAL INFILTRATION PERIHILAR.  THE PATIENT IS 

CURRENTLY ON OXYGEN AND THE PATIENT IS CURRENTLY ON 15 L TO MAINTAIN A 

SATURATION ABOVE 90%.  She is afebrile. 





Review of Systems


Constitutional: Reports fatigue, Reports weakness


Eyes: denies as per HPI, denies blurred vision, denies bulging eye, denies 

decreased vision, denies diplopia, denies discharge, denies dry eye, denies 

irritation, denies itching, denies pain, denies photophobia, denies loss of 

peripheral vision, denies loss of vision, denies tunnel vision/blind spots


Ears: deny: decreased hearing, ear discharge, earache, tinnitus


Ears, nose, mouth and throat: Denies headache, Denies sore throat


Breasts: absent: as per HPI, change in shape, gynecomastia, masses, nipple 

discharge, pain, skin changes, swelling


Cardiovascular: Reports decreased exercise tolerance, Reports dyspnea on 

exertion


Respiratory: Reports cough, Reports dyspnea


Gastrointestinal: Reports as per HPI


Genitourinary: Reports as per HPI


Menstruation: Reports as per HPI


Musculoskeletal: Reports as per HPI


Musculoskeletal: absent: ankle pain, ankle stiffness, ankle swelling


Psychiatric: Reports as per HPI


Endocrine: Reports as per HPI, Reports fatigue


Hematologic/Lymphatic: Reports as per HPI


Allergic/Immunologic: Reports as per HPI





Past Medical History


Past Medical History: Musculoskeletal Disorder, Thyroid Disorder


Additional Past Medical History / Comment(s): HERNIATED DISCS TO BACK-N/T RT LEG

AND FOOT, vertigo.  PAST GYN HISTORY: She has no history of STDs.  She used HRT 

from about 1496-0983.


History of Any Multi-Drug Resistant Organisms: None Reported


Past Surgical History: Back Surgery, Cholecystectomy, Orthopedic Surgery, 

Tonsillectomy


Additional Past Surgical History / Comment(s): [id number] (2nd, next after 

10yrs), D&C.


Past Anesthesia/Blood Transfusion Reactions: Previous Problems w/ Anesthesia, 

Motion Sickness, Postoperative Nausea & Vomiting (PONV)


Additional Past Anesthesia/Blood Transfusion Reaction / Comment(s): NEVER HAS 

HAD BLOOD TRANSFUSION


Past Psychological History: No Psychological Hx Reported


Smoking Status: Never smoker


Past Alcohol Use History: Rare


Past Drug Use History: None Reported





- Past Family History


  ** Father


Family Medical History: Cancer, COPD, Myocardial Infarction (MI)


Additional Family Medical History / Comment(s): MULT MYELOMA-PASSED AWAY AT AGE 

84





  ** Mother


Family Medical History: Coronary Artery Disease (CAD), Diabetes Mellitus, 

Myocardial Infarction (MI)


Additional Family Medical History / Comment(s): LIVING AT AGE 81,HEART STENTS X 

5





Medications and Allergies


                                Home Medications











 Medication  Instructions  Recorded  Confirmed  Type


 


Ibuprofen [Motrin] 800 mg PO TID 11/05/19 12/05/20 History


 


Levothyroxine Sodium 100 mcg PO DAILY 11/05/19 12/05/20 History


 


Phentermine HCl [Adipex-P] 37.5 mg PO AC-BRKFST 11/05/19 12/05/20 History


 


Dexamethasone [Decadron] 6 mg PO DAILY 12/05/20 12/05/20 History


 


Hydroxychloroquine Sulfate See Taper PO AS DIRECTED 12/05/20 12/05/20 History





[Plaquenil]    


 


buPROPion XL [Wellbutrin Xl] 150 mg PO DAILY 12/05/20 12/05/20 History


 


Gabapentin 300 mg PO BID 12/06/20 12/06/20 History


 


guaiFENesin [Mucinex] 1,200 mg PO BID 12/06/20 12/06/20 History








                                    Allergies











Allergy/AdvReac Type Severity Reaction Status Date / Time


 


No Known Allergies Allergy   Verified 12/05/20 18:37














Physical Exam


Vitals: 


                                   Vital Signs











  Temp Pulse Resp BP Pulse Ox


 


 12/07/20 11:20  97.8 F  69  18  148/81  90 L


 


 12/07/20 08:58      90 L


 


 12/07/20 05:52    20   96


 


 12/07/20 05:25    22   90 L


 


 12/07/20 05:20    22   78 L


 


 12/07/20 05:01    20   84 L


 


 12/07/20 04:55    20   74 L


 


 12/07/20 04:50  98.2 F  76  16  160/94  90 L


 


 12/06/20 22:55  97.9 F  68  16  166/82  88 L


 


 12/06/20 18:00      90 L


 


 12/06/20 16:45  98.4 F  76  18  132/76  90 L








                                Intake and Output











 12/06/20 12/07/20 12/07/20





 22:59 06:59 14:59


 


Other:   


 


  Voiding Method Toilet  


 


  # Voids 1 1 














Gen. appearance she is calm comfortable mild degree of respiratory distress 

currently on 15 L of oxygen by nasal cannula


Head exam was generally normal. There was no scleral icterus or corneal arcus. 

Mucous membranes were moist.


Neck was supple and without jugular venous distension, thyromegaly, or carotid 

bruits. Carotids were easily palpable bilaterally. There was no adenopathy.


Lungs sounds are diminished and the patient is contacted the lung bases.


Cardiac exam revealed the PMI to be normally situated and sized. The rhythm was 

regular and no extrasystoles were noted during several minutes of auscultation. 

The first and second heart sounds were normal and physiologic splitting of the 

second heart sound was noted. There were no murmurs, rubs, clicks, or gallops.


Abdomen abdomen


Abdominal exam revealed normal bowel sounds. The abdomen was soft, non-tender, 

and without masses, organomegaly, or appreciable enlargement of the abdominal 

aorta.


Examination of the extremities revealed easily palpable radial, femoral and ped

al pulses. There was no cyanosis, clubbing or edema.


Examination of the skin revealed no evidence of significant rashes, suspicious 

appearing nevi or other concerning lesions.





Results





- Laboratory Findings


CBC and BMP: 


                                 12/05/20 18:30





                                 12/05/20 18:30


PT/INR, D-dimer











PT  9.3 sec (9.0-12.0)   12/05/20  18:30    


 


INR  0.9  (<1.2)   12/05/20  18:30    


 


D-Dimer  0.53 mg/L FEU (<0.60)   12/07/20  06:46    








Abnormal lab findings: 


                                  Abnormal Labs











  12/05/20 12/05/20 12/05/20





  18:30 18:30 19:37


 


WBC  12.6 H  


 


Plt Count  472 H  


 


Neutrophils #  10.6 H  


 


Sodium   135 L 


 


BUN   20 H 


 


Creatinine   0.50 L 


 


Glucose   166 H 


 


Ferritin   360.3 H 


 


AST   76 H 


 


ALT   65 H 


 


Lactate Dehydrogenase   1038 H 


 


C-Reactive Protein   63.0 H 


 


Coronavirus (PCR)    Detected A














  12/07/20





  06:46


 


WBC 


 


Plt Count 


 


Neutrophils # 


 


Sodium 


 


BUN 


 


Creatinine 


 


Glucose 


 


Ferritin 


 


AST 


 


ALT 


 


Lactate Dehydrogenase 


 


C-Reactive Protein  6.7 H


 


Coronavirus (PCR) 














- Diagnostic Findings


Chest x-ray: image reviewed





Assessment and Plan


Plan: 





1 acute bilateral pneumonia secondary to rotavirus/Covid 19 infection  and the 

patient was probably diagnosedbn and became symptomatic less than 10 days ago 

and the patient will be an adequate candidate for steroids and Remdesivir 





2 acute hypoxic respiratory failure currently on 15 L of oxygen by nasal cannula





3 elevated inflammatory markers, mild





4 obesity





5 chronic back pain





Plan





Utilizing a combination of Decadron 6 mg by mouth daily, Remdesivir , vitamin C,

 vitamin D, zinc, melatonin, and the patient will be gradually wean off FiO2 as 

tolerated to maintain saturation above 90%.  Provide an incentive spirometer.  

Monitor inflammatory markers.  Utilize Lovenox for DVT prophylaxis.  We'll 

continue to follow.

## 2020-12-08 RX ADMIN — BUPROPION HYDROCHLORIDE SCH MG: 150 TABLET, FILM COATED, EXTENDED RELEASE ORAL at 10:12

## 2020-12-08 RX ADMIN — FAMOTIDINE SCH MG: 20 TABLET, FILM COATED ORAL at 20:36

## 2020-12-08 RX ADMIN — ENOXAPARIN SODIUM SCH MG: 40 INJECTION SUBCUTANEOUS at 10:11

## 2020-12-08 RX ADMIN — ACETAMINOPHEN PRN MG: 325 TABLET, FILM COATED ORAL at 21:33

## 2020-12-08 RX ADMIN — LEVOTHYROXINE SODIUM SCH MCG: 100 TABLET ORAL at 05:55

## 2020-12-08 RX ADMIN — CEFAZOLIN SCH MLS/HR: 330 INJECTION, POWDER, FOR SOLUTION INTRAMUSCULAR; INTRAVENOUS at 20:37

## 2020-12-08 RX ADMIN — FAMOTIDINE SCH MG: 20 TABLET, FILM COATED ORAL at 10:12

## 2020-12-08 RX ADMIN — ACETAMINOPHEN PRN MG: 325 TABLET, FILM COATED ORAL at 10:24

## 2020-12-08 RX ADMIN — CEFAZOLIN SCH: 330 INJECTION, POWDER, FOR SOLUTION INTRAMUSCULAR; INTRAVENOUS at 15:42

## 2020-12-08 RX ADMIN — CEFAZOLIN SCH MLS/HR: 330 INJECTION, POWDER, FOR SOLUTION INTRAMUSCULAR; INTRAVENOUS at 02:22

## 2020-12-08 RX ADMIN — GABAPENTIN SCH MG: 300 CAPSULE ORAL at 10:12

## 2020-12-08 RX ADMIN — ACETAMINOPHEN PRN MG: 325 TABLET, FILM COATED ORAL at 04:13

## 2020-12-08 RX ADMIN — Medication SCH MG: at 10:12

## 2020-12-08 RX ADMIN — IBUPROFEN PRN MG: 400 TABLET, FILM COATED ORAL at 05:57

## 2020-12-08 RX ADMIN — Medication SCH UNIT: at 10:12

## 2020-12-08 RX ADMIN — CEFAZOLIN SCH: 330 INJECTION, POWDER, FOR SOLUTION INTRAMUSCULAR; INTRAVENOUS at 16:37

## 2020-12-08 RX ADMIN — IBUPROFEN PRN MG: 400 TABLET, FILM COATED ORAL at 00:16

## 2020-12-08 RX ADMIN — OXYCODONE HYDROCHLORIDE AND ACETAMINOPHEN SCH MG: 500 TABLET ORAL at 10:12

## 2020-12-08 RX ADMIN — GABAPENTIN SCH MG: 300 CAPSULE ORAL at 20:36

## 2020-12-08 NOTE — P.PN
Progress Note - Text


Progress Note Date: 12/08/20





Chief Complaint: Cough





History of presenting complaint:


This is a 65-year-old patient of Dr. williamson.  Chronic stable medical 

conditions include hypothyroid, herniated disc.  She presented to the ER with 

complaints of weakness and shortness of breath.  Her entire family has the cold 

and a COVID test was negative.  Her pulse ox at home and was 78%.  And in the ER

it was 85%.  She is a nonsmoker.  Her primary care giver azithromycin, 

Hydrochlroquin and steroids.  She took these but the symptoms are not getting 

any better.  Symptoms have been going on for about 10 days.  She is also had 

some fever and chills.  Decreased appetite.  Decrease in taste and smell.  Had 

some headaches.  Has some diarrhea.  Also notices beige sputum.  Feels a chest 

tightness for deep breath.


Admitted with bilateral COVID pneumonia, possible gram-negative pneumonia, acute

hypoxic respiratory failure.  Started on IV Solu-Medrol Lovenox IV ceftriaxone. 

Remdesivir-added.


Today-more short of breath.-Flow oxygen 15 L.  Some cough.  Tired





Review of systems: Was done for constitutional, cardiovascular, GI, pulmonary. 

relevant finding as above





Active Medications





Acetaminophen (Acetaminophen Tab 325 Mg Tab)  650 mg PO Q6HR PRN


   PRN Reason: Mild Pain or Fever > 100.5


   Last Admin: 12/08/20 21:33 Dose:  650 mg


   Documented by: 


Ascorbic Acid (Ascorbic Acid 500 Mg Tab)  500 mg PO DAILY [facility]


   Last Admin: 12/08/20 10:12 Dose:  500 mg


   Documented by: 


Bupropion HCl (Bupropion Xl 150 Mg Tab.Er.24h)  150 mg PO DAILY [facility]


   Last Admin: 12/08/20 10:12 Dose:  150 mg


   Documented by: 


Cholecalciferol (Cholecalciferol 1,000 Unit Tab)  1,000 unit PO DAILY [facility]


   Last Admin: 12/08/20 10:12 Dose:  1,000 unit


   Documented by: 


Dexamethasone (Dexamethasone 2 Mg Tab)  6 mg PO DAILY [facility]


   Last Admin: 12/08/20 10:12 Dose:  6 mg


   Documented by: 


Enoxaparin Sodium (Enoxaparin 40 Mg/0.4 Ml Syringe)  40 mg SQ DAILY [facility]


   Last Admin: 12/08/20 10:11 Dose:  40 mg


   Documented by: 


Famotidine (Famotidine 20 Mg Tab)  20 mg PO BID [facility]


   Last Admin: 12/08/20 20:36 Dose:  20 mg


   Documented by: 


Gabapentin (Gabapentin 300 Mg Cap)  300 mg PO BID [facility]


   Last Admin: 12/08/20 20:36 Dose:  300 mg


   Documented by: 


Ceftriaxone Sodium 2 gm/ (Sodium Chloride)  50 mls @ 100 mls/hr IVPB Q24HR [facility]


   Last Admin: 12/08/20 10:11 Dose:  100 mls/hr


   Documented by: 


Remdesivir 100 mg/ Sodium (Chloride)  250 mls @ 250 mls/hr IVPB DAILY@1200 [facility]


   Stop: 12/12/20 12:59


Sodium Chloride (Saline 0.9%)  1,000 mls @ 50 mls/hr IV .Q20H [facility]


   Last Admin: 12/08/20 20:37 Dose:  50 mls/hr


   Documented by: 


Ibuprofen (Ibuprofen 400 Mg Tab)  400 mg PO Q6HR PRN


   PRN Reason: Mild Pain or Fever > 100.5


   Last Admin: 12/08/20 05:57 Dose:  400 mg


   Documented by: 


Levothyroxine Sodium (Levothyroxine 100 Mcg Tab)  100 mcg PO DAILY@0630 [facility]


   Last Admin: 12/08/20 05:55 Dose:  100 mcg


   Documented by: 


Naloxone HCl (Naloxone 0.4 Mg/Ml 1 Ml Vial)  0.2 mg IV Q2M PRN


   PRN Reason: Opioid Reversal


Ondansetron HCl (Ondansetron 4 Mg/2 Ml Vial)  4 mg IVP Q8HR PRN


   PRN Reason: Nausea And Vomiting


Zinc Sulfate (Zinc Sulfate 220 Mg Cap)  220 mg PO DAILY [facility]


   Last Admin: 12/08/20 10:12 Dose:  220 mg


   Documented by: 














Physical examination:


VITAL SIGNS: 98.1, 67, 20, 171/78, 90% on 15 L


GENERAL: Sitting up, more short of breath.  


PSYCH: [Alert and oriented x3;  mood  and affect anxious]l.  


NEUROLOGICAL: Cranial nerves grossly intact.  Moving all 4 limbs


Rest of the exam as per nursing, pulmonary





INVESTIGATIONS, reviewed in the clinical context:


December 7: D-dimer 0.53 CRP 6.7 pro-calcitonin 0.02


White count 12.6 hemoglobin 15.1 and platelets 472 increased neutrophils


d-dimer 0.38 potassium 4.6 bun 20 creatinine 0.50


CRP 63


Coronavirus PCF-detected


EKG tracing personally reviewed by me-no sinus rhythm


Chest x-ray film personally reviewed by me-bilateral scattered infiltrates





Assessment:


-Bilateral COVID 19 pneumonia - slow to respond


-Possible gram-negative pneumonia, patient has beige sputum and left shift on 

CBC-on ceftriaxone


-Acute hypoxic respiratory failure from pneumonia-slow to respond on 15 L nasal 

cannula


-Hypothyroid


-Morbid obesity BMI 41.6





Plan:


Continue IV Solu-Medrol.  Ceftriaxone, ,  High flow oxygen 15 L, Subcu Lovenox. 

Remdesivir was added.  Also convalescent plasma was ordered by Dr. Sigala.

## 2020-12-08 NOTE — P.PN
Subjective


Progress Note Date: 12/08/20








HISTORY OF PRESENT ILLNESS


This is a 65-year-old  female had onset of symptoms 10 days ago with 

fever, cough, shortness of breath.  She states for the first 6 or 7 day she was 

feeling okay but by day #8 she started feeling terrible.  She bought pulse ox 

reader and initially she was running 90% but then dropped down to 78%.  She was 

doing virtual visits with her physician's office and was put on dexamethasone, 

is azithromycin and completed course.  On recheck, patient was placed on 

hydroxychloroquine.  She denies having any abdominal pain.  No diarrhea.  She 

complains of a cough with chest pain with coughing, dark beige sputum 

production.  She complains of chest pain with deep inspiration.  She is feeling 

improvement since admission but continues to have cough and shortness of breath.

 Pulse ox is 90% on 15 L nasal cannula, heart rate 69, blood pressure 140/81.  

She has been afebrile.  W BC 12.6.  Lymphocytes normal.  Initial d-dimer 0.38 

and repeat 0.53.  Creatinine 0.5.  Ferritin level CCCLX.3.  AST 76, ALT 65, 

alkaline phosphatase 83.  LDH 1038.  C-reactive protein 63 with repeat of 6.7.  

COVID-19 positive.  Pro-calcitonin was 0.04 and repeat 0.02.  Chest x-ray 

reveals new bilateral interstitial pneumonia.  Patient has been started on 

ceftriaxone, dexamethasone 6 mg oral daily, Lovenox 40 mg subcu daily, 

supplements.








12/8: Patient is to start Remdesivir today and Dr. Sigala has also ordered 

convalescent plasma.  Patient continues to have a mild wheeze.  She denies 

having any chest pain but does have chest pain with deep breathing.  Positive 

cough.  No nausea, vomiting or diarrhea.  No abdominal pain.  Patient has been 

afebrile, heart rate 67, blood pressure 171/78.  Pulse ox 90% on 15 L high flow 

nasal cannula.








PHYSICAL EXAMINATION


Gen: This is a morbidly obese 65-year-old  female.


HEENT: Head is atraumatic, normocephalic. Pupils equal, round. Sclerae is 

anicteric. 


NECK: Supple. No JVD. 


LUNGS: Diminished in the bilateral bases.  Mild expiratory wheeze.  No 

intercostal retractions.


HEART: Regular rate and rhythm. No murmur. 


ABDOMEN: Soft. Bowel sounds are present. No masses.  No tenderness.


EXTREMITIES: No pedal edema.  No calf tenderness.


NEUROLOGICAL: Patient is awake, alert and oriented x3. 





ASSESSMENT


Covid 19 pneumonia


Acute hypoxic respiratory failure


Elevated inflammatory markers





PLAN


Start Remdesivir 200 mg 1 followed by 100 mg to complete a total of 5 days


Dr. Sigala has ordered convalescent plasma


Continue ceftriaxone, dexamethasone 6 mg oral daily, Lovenox 40 mg subcu daily, 

supplements


Continue oxygen supplementation


Blood culture in progress





The above dictated assessment and findings were discussed with Dr. Garcia.  The 

impression and plan of care have been directed as dictated.  Mary Carmen Carter nurse 

practitioner acting as scribe for Dr. Garcia.











Objective





- Vital Signs


Vital signs: 


                                   Vital Signs











Temp  98.1 F   12/08/20 11:00


 


Pulse  67   12/08/20 11:00


 


Resp  20   12/08/20 11:00


 


BP  171/78   12/08/20 11:00


 


Pulse Ox  90 L  12/08/20 11:00








                                 Intake & Output











 12/07/20 12/08/20 12/08/20





 18:59 06:59 18:59


 


Intake Total  1680 


 


Output Total  800 


 


Balance  880 


 


Intake:   


 


  Intake, IV Titration  1200 





  Amount   


 


    Sodium Chloride 0.9% 1,  1200 





    000 ml @ 100 mls/hr IV .   





    Q10H [facility] Rx#:[id number]   


 


  Oral  480 


 


Output:   


 


  Urine  800 


 


Other:   


 


  Voiding Method  Toilet Bedside Commode


 


  # Voids 5 2 


 


  # Bowel Movements  0 














- Labs


CBC & Chem 7: 


                                 12/05/20 18:30





                                 12/05/20 18:30


Labs: 


                      Microbiology - Last 24 Hours (Table)











 12/05/20 18:30 Blood Culture - Preliminary





 Blood    No Growth after 48 hours

## 2020-12-08 NOTE — P.PN
Progress Note - Text


Progress Note Date: 12/07/20





Chief Complaint: Cough





History of presenting complaint:


This is a 65-year-old patient of Dr. williamson.  Chronic stable medical 

conditions include hypothyroid, herniated disc.  She presented to the ER with 

complaints of weakness and shortness of breath.  Her entire family has the cold 

and a COVID test was negative.  Her pulse ox syndrome and was 78%.  And in the 

ER it was 85%.  She is a nonsmoker.  Her primary care giver azithromycin, 

hydrocodone [place] and steroids.  She took these but the symptoms are not 

getting any better.  Symptoms have been going on for about 10 days.  She is also

had some fever and chills.  Decreased appetite.  Decrease in taste and smell.  

Had some headaches.  Has some diarrhea.  Also notices beige sputum.  Feels a 

chest tightness for deep breath.


Admitted with bilateral COVID pneumonia, possible gram-negative pneumonia, acute

hypoxic respiratory failure.  Started on IV Solu-Medrol Lovenox IV ceftriaxone.


Today-short of breath.-Oxygen-high flow.  Cough.  Tired.  Eating fairly well





Review of systems: Was done for constitutional, cardiovascular, GI, pulmonary. 

relevant finding as above





Current medications reviewed in today's electronic records








Physical examination:


VITAL SIGNS: 97.8, 69, 18, 148/81, 90% on 15 L


GENERAL: Sitting up, tired short of breath.  


PSYCH: [Alert and oriented x3;  mood  and affect anxious]l.  


NEUROLOGICAL: Cranial nerves grossly intact.  Moving all 4 limbs


Rest of the exam as per nursing, pulmonary





INVESTIGATIONS, reviewed in the clinical context:


December 7: D-dimer 0.53 CRP 6.7 pro-calcitonin 0.02


White count 12.6 hemoglobin 15.1 and platelets 472 increased neutrophils


d-dimer 0.38 potassium 4.6 bun 20 creatinine 0.50


CRP 63


Coronavirus PCF-detected


EKG tracing personally reviewed by me-no sinus rhythm


Chest x-ray film personally reviewed by me-bilateral scattered infiltrates





Assessment:


-Bilateral COVID 19 pneumonia I will slow to respond


-Possible gram-negative pneumonia, patient has beige sputum and left shift on 

CBC-on ceftriaxone


-Acute hypoxic respiratory failure from above-worsening on 15 L nasal cannula


-Hypothyroid


-Morbid obesity BMI 41.6





Plan:


Continue IV Solu-Medrol. ,  High flow oxygen 15 L, Subcu Lovenox.  Care was 

discussed with the patient.  Follows with pulmonary

## 2020-12-08 NOTE — P.PN
Subjective


Progress Note Date: 12/08/20








65-year-old male patient and the patient came into the emergency department 

yesterday because of generalized weakness shortness of breath.  The patient had 

a pulse ox of 70%.  The patient is a nonsmoker.  Apparently symptoms of been 

progressively getting worse over the past 10 days. she originally got sick 

around Thanksgiving and her symptoms were waxing and waning and progressively 

she got worse and she ultimately had come to the hospital because of worsening 

shortness of breath. He had some fever and chills.  He had diminished appetite. 

He had decreased taste and smell.  The patient tested positive for COVID 19 by 

PCR and the patient is currently being treated for this pneumonia.  The patient 

was admitted because of 12.6.  The patient has a platelet count of 472.  

Preservation.  Positive normal.  D-dimer is not elevated at 0.53.  The ferritin 

level is at 360, the patient has a mild transaminitis with an AST of 76, ALP of 

65 with an LDH of 1038.  CRP is at 63 and appropriate LEVEL IS AT 0.04.  REST X-

RAY SHOWING NEW BILATERAL INTERSTITIAL INFILTRATION PERIHILAR.  THE PATIENT IS 

CURRENTLY ON OXYGEN AND THE PATIENT IS CURRENTLY ON 15 L TO MAINTAIN A 

SATURATION ABOVE 90%.  She is afebrile. 








on today's evaluation of 12/08/2020, the patient is a bit struggling with her 

breathing.  She was able to sit up on a chair or morning.  Noted the patient was

started on a combination of oxygen delivery systems including high flow oxygen 

at 15 L nasal cannula and 100% nonrebreather facemask to maintain a saturation 

between 88-92%.  She has some limited cough.  No significant sputum production. 

She is laying comfortably in bed.  she is having some mild degree of shortness 

of breath even at rest.  She is not using her muscles of breathing.   Note that 

the patient was started on a combination of Decadron and Remdesivir I'm also 

inclining giving her a unit of convalescence as her condition. no nausea.  No 

vomiting.  No diarrhea.  No abdominal pain.  No altered mentation.





Objective





- Vital Signs


Vital signs: 


                                   Vital Signs











Temp  98.1 F   12/08/20 11:00


 


Pulse  67   12/08/20 11:00


 


Resp  20   12/08/20 11:00


 


BP  171/78   12/08/20 11:00


 


Pulse Ox  90 L  12/08/20 11:00








                                 Intake & Output











 12/07/20 12/08/20 12/08/20





 18:59 06:59 18:59


 


Intake Total  1680 


 


Output Total  800 


 


Balance  880 


 


Intake:   


 


  Intake, IV Titration  1200 





  Amount   


 


    Sodium Chloride 0.9% 1,  1200 





    000 ml @ 100 mls/hr IV .   





    [facility] Rx#:[id number]   


 


  Oral  480 


 


Output:   


 


  Urine  800 


 


Other:   


 


  Voiding Method  Toilet Bedside Commode


 


  # Voids 5 2 


 


  # Bowel Movements  0 














- Exam








Gen. appearance she is calm comfortable mild degree of respiratory distress c

urrently on 15 L of oxygen by nasal cannula in addition to 100% nonrebreather 

facemask.


Head exam was generally normal. There was no scleral icterus or corneal arcus. 

Mucous membranes were moist.


Neck was supple and without jugular venous distension, thyromegaly, or carotid 

bruits. Carotids were easily palpable bilaterally. There was no adenopathy.


Lungs sounds are diminished and the patient is contacted the lung bases.


Cardiac exam revealed the PMI to be normally situated and sized. The rhythm was 

regular and no extrasystoles were noted during several minutes of auscultation. 

The first and second heart sounds were normal and physiologic splitting of the 

second heart sound was noted. There were no murmurs, rubs, clicks, or gallops.


Abdomen abdomen


Abdominal exam revealed normal bowel sounds. The abdomen was soft, non-tender, 

and without masses, organomegaly, or appreciable enlargement of the abdominal 

aorta.


Examination of the extremities revealed easily palpable radial, femoral and pe

baldomero pulses. There was no cyanosis, clubbing or edema.


Examination of the skin revealed no evidence of significant rashes, suspicious 

appearing nevi or other concerning lesions.








- Labs


CBC & Chem 7: 


                                 12/05/20 18:30





                                 12/05/20 18:30


Labs: 


                      Microbiology - Last 24 Hours (Table)











 12/05/20 18:30 Blood Culture - Preliminary





 Blood    No Growth after 48 hours














Assessment and Plan


Plan: 





1 acute bilateral pneumonia secondary to Covid 19 infection  and the patient was

probably diagnosed  and became symptomatic less than 10 days ago and the patient

will be an adequate candidate for steroids and Remdesivir 





2 acute hypoxic respiratory failure currently on 15 L of oxygen by nasal 

cannula, in addition to 100% nonrebreather facemask.





3 elevated inflammatory markers, mild





4 obesity





5 chronic back pain





Plan





Utilizing a combination of Decadron 6 mg by mouth daily, Remdesivir , vitamin C,

vitamin D, zinc, melatonin, and the patient will be gradually wean off FiO2 as 

tolerated to maintain saturation above 90%.  Provide an incentive spirometer.  

Monitor inflammatory markers.  there has been some progression in her condition 

with worsening in the oxygenation compared to yesterday.  She is currently 

stable.  Monitor the pulse ox.  Repeat chest x-ray in the morning.  Repeat all 

of the inflammatory markers in the morning.  Proceed with units of Novolin 

plasma.

## 2020-12-09 LAB — LDH SPEC-CCNC: 656 U/L (ref 120–246)

## 2020-12-09 RX ADMIN — ACETAMINOPHEN PRN MG: 325 TABLET, FILM COATED ORAL at 05:11

## 2020-12-09 RX ADMIN — LEVOTHYROXINE SODIUM SCH MCG: 100 TABLET ORAL at 05:11

## 2020-12-09 RX ADMIN — METHYLPREDNISOLONE SODIUM SUCCINATE SCH MG: 40 INJECTION, POWDER, FOR SOLUTION INTRAMUSCULAR; INTRAVENOUS at 14:41

## 2020-12-09 RX ADMIN — Medication SCH MG: at 07:53

## 2020-12-09 RX ADMIN — CEFAZOLIN SCH MLS/HR: 330 INJECTION, POWDER, FOR SOLUTION INTRAMUSCULAR; INTRAVENOUS at 07:58

## 2020-12-09 RX ADMIN — REMDESIVIR SCH MLS/HR: 100 INJECTION, POWDER, LYOPHILIZED, FOR SOLUTION INTRAVENOUS at 12:58

## 2020-12-09 RX ADMIN — FAMOTIDINE SCH MG: 20 TABLET, FILM COATED ORAL at 21:00

## 2020-12-09 RX ADMIN — IBUPROFEN PRN MG: 400 TABLET, FILM COATED ORAL at 20:59

## 2020-12-09 RX ADMIN — OXYCODONE HYDROCHLORIDE AND ACETAMINOPHEN SCH MG: 500 TABLET ORAL at 07:53

## 2020-12-09 RX ADMIN — Medication SCH UNIT: at 07:53

## 2020-12-09 RX ADMIN — BUPROPION HYDROCHLORIDE SCH MG: 150 TABLET, FILM COATED, EXTENDED RELEASE ORAL at 07:53

## 2020-12-09 RX ADMIN — FAMOTIDINE SCH MG: 20 TABLET, FILM COATED ORAL at 07:53

## 2020-12-09 RX ADMIN — IBUPROFEN PRN MG: 400 TABLET, FILM COATED ORAL at 03:42

## 2020-12-09 RX ADMIN — ENOXAPARIN SODIUM SCH MG: 100 INJECTION SUBCUTANEOUS at 21:00

## 2020-12-09 RX ADMIN — ACETAMINOPHEN PRN MG: 325 TABLET, FILM COATED ORAL at 17:58

## 2020-12-09 RX ADMIN — ENOXAPARIN SODIUM SCH MG: 40 INJECTION SUBCUTANEOUS at 07:52

## 2020-12-09 RX ADMIN — GABAPENTIN SCH MG: 300 CAPSULE ORAL at 07:53

## 2020-12-09 RX ADMIN — GABAPENTIN SCH MG: 300 CAPSULE ORAL at 21:01

## 2020-12-09 RX ADMIN — IBUPROFEN PRN MG: 400 TABLET, FILM COATED ORAL at 10:21

## 2020-12-09 NOTE — XR
EXAMINATION TYPE: XR chest 1V portable

 

DATE OF EXAM: 12/9/2020

 

COMPARISON: Prior chest x-ray 12/5/2020

 

HISTORY: Pneumonia

 

TECHNIQUE: Single frontal view of the chest is obtained.

 

FINDINGS:  Bilateral airspace disease is again noted. There may be some more confluent density in the
 upper lobes. Heart is stable. No pneumothorax or pleural effusion.

 

IMPRESSION:  Correlate for pneumonia, edema, follow-up recommended.

## 2020-12-09 NOTE — P.PN
Subjective


Progress Note Date: 12/09/20








65-year-old male patient and the patient came into the emergency department 

yesterday because of generalized weakness shortness of breath.  The patient had 

a pulse ox of 70%.  The patient is a nonsmoker.  Apparently symptoms of been 

progressively getting worse over the past 10 days. she originally got sick 

around Thanksgiving and her symptoms were waxing and waning and progressively 

she got worse and she ultimately had come to the hospital because of worsening 

shortness of breath. He had some fever and chills.  He had diminished appetite. 

He had decreased taste and smell.  The patient tested positive for COVID 19 by 

PCR and the patient is currently being treated for this pneumonia.  The patient 

was admitted because of 12.6.  The patient has a platelet count of 472.  

Preservation.  Positive normal.  D-dimer is not elevated at 0.53.  The ferritin 

level is at 360, the patient has a mild transaminitis with an AST of 76, ALP of 

65 with an LDH of 1038.  CRP is at 63 and appropriate LEVEL IS AT 0.04.  REST X-

RAY SHOWING NEW BILATERAL INTERSTITIAL INFILTRATION PERIHILAR.  THE PATIENT IS 

CURRENTLY ON OXYGEN AND THE PATIENT IS CURRENTLY ON 15 L TO MAINTAIN A 

SATURATION ABOVE 90%.  She is afebrile. 








on today's evaluation of 12/08/2020, the patient is a bit struggling with her 

breathing.  She was able to sit up on a chair or morning.  Noted the patient was

started on a combination of oxygen delivery systems including high flow oxygen 

at 15 L nasal cannula and 100% nonrebreather facemask to maintain a saturation 

between 88-92%.  She has some limited cough.  No significant sputum production. 

She is laying comfortably in bed.  she is having some mild degree of shortness 

of breath even at rest.  She is not using her muscles of breathing.   Note that 

the patient was started on a combination of Decadron and Remdesivir I'm also 

inclining giving her a unit of convalescence as her condition. no nausea.  No 

vomiting.  No diarrhea.  No abdominal pain.  No altered mentation.





on 12/09/2020 16 the patient for a follow-up.  The patient is currently on a 15 

L high flow oxygen.  On and off she is also using the 100% nonrebreather 

facemask.  She is doing well.  No specific complaints.  She is getting less 

short of breath.  Her lungs are less achy on today's evaluation.  No nausea.  No

vomiting.  She is on day 2 of Remdesivir she is also on Decadron.  She receives 

convalescent plasma.  No nausea.  No vomiting.  No diarrhea.





Objective





- Vital Signs


Vital signs: 


                                   Vital Signs











Temp  97.6 F   12/09/20 10:35


 


Pulse  74   12/09/20 10:35


 


Resp  19   12/09/20 10:35


 


BP  112/74   12/09/20 10:35


 


Pulse Ox  91 L  12/09/20 10:35








                                 Intake & Output











 12/08/20 12/09/20 12/09/20





 18:59 06:59 18:59


 


Intake Total 0 1210 


 


Balance 0 1210 


 


Intake:   


 


  Intake, IV Titration  600 





  Amount   


 


    Sodium Chloride 0.9% 1,  600 





    000 ml @ 50 mls/hr IV .   





    Q20H [facility] Rx#:[id number]   


 


  Oral  610 


 


  Blood Product 0  


 


    Ffp Pher Conval Covid19 0  





    Acda 3  Unit   





    [id number]   


 


Other:   


 


  Voiding Method Bedside Commode Bedside Commode 


 


  # Voids 2 2 


 


  # Bowel Movements  1 














- Exam








Gen. appearance she is calm comfortable mild degree of respiratory distress 

currently on 15 L of oxygen by nasal cannula in addition to 100% nonrebreather 

facemask.


Head exam was generally normal. There was no scleral icterus or corneal arcus. 

Mucous membranes were moist.


Neck was supple and without jugular venous distension, thyromegaly, or carotid 

bruits. Carotids were easily palpable bilaterally. There was no adenopathy.


Lungs sounds are diminished and the patient is contacted the lung bases.


Cardiac exam revealed the PMI to be normally situated and sized. The rhythm was 

regular and no extrasystoles were noted during several minutes of auscultation. 

The first and second heart sounds were normal and physiologic splitting of the 

second heart sound was noted. There were no murmurs, rubs, clicks, or gallops.


Abdomen abdomen


Abdominal exam revealed normal bowel sounds. The abdomen was soft, non-tender, 

and without masses, organomegaly, or appreciable enlargement of the abdominal 

aorta.


Examination of the extremities revealed easily palpable radial, femoral and 

pedal pulses. There was no cyanosis, clubbing or edema.


Examination of the skin revealed no evidence of significant rashes, suspicious 

appearing nevi or other concerning lesions.








- Labs


CBC & Chem 7: 


                                 12/05/20 18:30





                                 12/05/20 18:30


Labs: 


                  Abnormal Lab Results - Last 24 Hours (Table)











  12/09/20 12/09/20 Range/Units





  05:56 05:59 


 


D-Dimer   1.67 H  (<0.60)  mg/L FEU


 


Lactate Dehydrogenase  656 H   (120-246)  U/L


 


C-Reactive Protein  25.3 H   (0.0-0.8)  mg/dL








                      Microbiology - Last 24 Hours (Table)











 12/05/20 18:30 Blood Culture - Preliminary





 Blood    No Growth after 72 hours














Assessment and Plan


Plan: 





1 acute bilateral pneumonia secondary to Covid 19 infection  and the patient was

probably diagnosed  and became symptomatic less than 10 days ago and the patient

will be an adequate candidate for steroids and Remdesivir #2,clinically improved

and the patient also received a dose of convalescent plasma.





2 acute hypoxic respiratory failure currently on 15 L of oxygen by nasal 

cannula, in addition to 100% nonrebreather facemask.





3 elevated inflammatory markers, mild





4 obesity





5 chronic back pain





Plan





Utilizing a combination of Decadron 6 mg by mouth daily, Remdesivir , vitamin C,

vitamin D, zinc, melatonin, and the patient will be gradually wean off FiO2 as 

tolerated to maintain saturation above 90%.  Provide an incentive spirometer.  

Monitor inflammatory markers and order them for tomorrow.  Attempt to titrate 

FiO2 as tolerated..  there has been some progression in her condition with 

worsening in the oxygenation compared to yesterday.  She is currently stable.  

Monitor the pulse ox.  Repeat chest x-ray in the morning.  Repeat all of the 

inflammatory markers in the morning.

## 2020-12-10 LAB
FERRITIN SERPL-MCNC: 1059.4 NG/ML (ref 10–291)
LDH SPEC-CCNC: 506 U/L (ref 120–246)

## 2020-12-10 RX ADMIN — METHYLPREDNISOLONE SODIUM SUCCINATE SCH MG: 40 INJECTION, POWDER, FOR SOLUTION INTRAMUSCULAR; INTRAVENOUS at 00:13

## 2020-12-10 RX ADMIN — Medication SCH MG: at 09:07

## 2020-12-10 RX ADMIN — OXYCODONE HYDROCHLORIDE AND ACETAMINOPHEN SCH MG: 500 TABLET ORAL at 09:07

## 2020-12-10 RX ADMIN — REMDESIVIR SCH MLS/HR: 100 INJECTION, POWDER, LYOPHILIZED, FOR SOLUTION INTRAVENOUS at 14:48

## 2020-12-10 RX ADMIN — ACETAMINOPHEN PRN MG: 325 TABLET, FILM COATED ORAL at 00:18

## 2020-12-10 RX ADMIN — METHYLPREDNISOLONE SODIUM SUCCINATE SCH MG: 40 INJECTION, POWDER, FOR SOLUTION INTRAMUSCULAR; INTRAVENOUS at 09:07

## 2020-12-10 RX ADMIN — Medication SCH UNIT: at 09:07

## 2020-12-10 RX ADMIN — FAMOTIDINE SCH MG: 20 TABLET, FILM COATED ORAL at 20:22

## 2020-12-10 RX ADMIN — GABAPENTIN SCH MG: 300 CAPSULE ORAL at 09:07

## 2020-12-10 RX ADMIN — IBUPROFEN PRN MG: 400 TABLET, FILM COATED ORAL at 20:21

## 2020-12-10 RX ADMIN — LEVOTHYROXINE SODIUM SCH MCG: 100 TABLET ORAL at 05:08

## 2020-12-10 RX ADMIN — ENOXAPARIN SODIUM SCH MG: 100 INJECTION SUBCUTANEOUS at 20:24

## 2020-12-10 RX ADMIN — METHYLPREDNISOLONE SODIUM SUCCINATE SCH MG: 40 INJECTION, POWDER, FOR SOLUTION INTRAMUSCULAR; INTRAVENOUS at 23:45

## 2020-12-10 RX ADMIN — BUPROPION HYDROCHLORIDE SCH MG: 150 TABLET, FILM COATED, EXTENDED RELEASE ORAL at 09:07

## 2020-12-10 RX ADMIN — METHYLPREDNISOLONE SODIUM SUCCINATE SCH MG: 40 INJECTION, POWDER, FOR SOLUTION INTRAMUSCULAR; INTRAVENOUS at 17:46

## 2020-12-10 RX ADMIN — GABAPENTIN SCH MG: 300 CAPSULE ORAL at 20:22

## 2020-12-10 RX ADMIN — FAMOTIDINE SCH MG: 20 TABLET, FILM COATED ORAL at 09:07

## 2020-12-10 RX ADMIN — ENOXAPARIN SODIUM SCH MG: 100 INJECTION SUBCUTANEOUS at 09:07

## 2020-12-10 RX ADMIN — IBUPROFEN PRN MG: 400 TABLET, FILM COATED ORAL at 05:08

## 2020-12-10 RX ADMIN — ACETAMINOPHEN PRN MG: 325 TABLET, FILM COATED ORAL at 13:50

## 2020-12-10 NOTE — P.PN
Subjective


Progress Note Date: 12/10/20








HISTORY OF PRESENT ILLNESS


This is a 65-year-old  female had onset of symptoms 10 days ago with 

fever, cough, shortness of breath.  She states for the first 6 or 7 day she was 

feeling okay but by day #8 she started feeling terrible.  She bought pulse ox 

reader and initially she was running 90% but then dropped down to 78%.  She was 

doing virtual visits with her physician's office and was put on dexamethasone, 

is azithromycin and completed course.  On recheck, patient was placed on 

hydroxychloroquine.  She denies having any abdominal pain.  No diarrhea.  She 

complains of a cough with chest pain with coughing, dark beige sputum 

production.  She complains of chest pain with deep inspiration.  She is feeling 

improvement since admission but continues to have cough and shortness of breath.

 Pulse ox is 90% on 15 L nasal cannula, heart rate 69, blood pressure 140/81.  

She has been afebrile.  W BC 12.6.  Lymphocytes normal.  Initial d-dimer 0.38 

and repeat 0.53.  Creatinine 0.5.  Ferritin level CCCLX.3.  AST 76, ALT 65, 

alkaline phosphatase 83.  LDH 1038.  C-reactive protein 63 with repeat of 6.7.  

COVID-19 positive.  Pro-calcitonin was 0.04 and repeat 0.02.  Chest x-ray 

reveals new bilateral interstitial pneumonia.  Patient has been started on 

ceftriaxone, dexamethasone 6 mg oral daily, Lovenox 40 mg subcu daily, 

supplements.








12/10: Patient is on day #3/5 of Remdesivir and status post convalescent plasma.

 Patient takes that she is feeling better.  She denies any chest pain but she 

does have pain with deep breathing.  She complains of a beige and brown sputum 

production.  She denies having any abdominal symptoms including abdominal pain, 

nausea vomiting or diarrhea.  She continues to have a little wheeze.  Repeat 

chest x-ray ordered for the morning.  Patient is currently pulse ox 94% on 15 L 

nonrebreather.  Afebrile, heart rate 74, blood pressure 154/75.  Blood culture 

shows no growth at 96 hours.





PHYSICAL EXAMINATION


Gen: This is a morbidly obese 65-year-old  female.


HEENT: Head is atraumatic, normocephalic. Pupils equal, round. Sclerae is 

anicteric. 


NECK: Supple. No JVD. 


LUNGS: Diminished in the bilateral bases.  Mild expiratory wheeze.  No interco

stal retractions.


HEART: Regular rate and rhythm. No murmur. 


ABDOMEN: Soft. Bowel sounds are present. No masses.  No tenderness.


EXTREMITIES: No pedal edema.  No calf tenderness.


NEUROLOGICAL: Patient is awake, alert and oriented x3. 





ASSESSMENT


Covid 19 pneumonia


Acute hypoxic respiratory failure


Elevated inflammatory markers





PLAN


Continue Remdesivir day #3/5


Status post convalescent plasma


Continue Solu-Medrol 40 mg IV every 8 hours, Lovenox, supplements


Continue oxygen supplementation


Blood culture in progress





The above dictated assessment and findings were discussed with Dr. Garcia.  The 

impression and plan of care have been directed as dictated.  Mary Carmen Carter nurse 

practitioner acting as scribe for Dr. Garcia.











Objective





- Vital Signs


Vital signs: 


                                   Vital Signs











Temp  98.2 F   12/10/20 11:00


 


Pulse  74   12/10/20 11:00


 


Resp  18   12/10/20 11:00


 


BP  154/75   12/10/20 11:00


 


Pulse Ox  94 L  12/10/20 11:00








                                 Intake & Output











 12/09/20 12/10/20 12/10/20





 18:59 06:59 18:59


 


Weight   113.398 kg


 


Other:   


 


  Voiding Method  Bedside Commode Bedside Commode


 


  # Voids 2 4 


 


  # Bowel Movements 1 1 














- Labs


CBC & Chem 7: 


                                 12/05/20 18:30





                                 12/05/20 18:30


Labs: 


                  Abnormal Lab Results - Last 24 Hours (Table)











  12/10/20 12/10/20 Range/Units





  06:21 06:21 


 


D-Dimer  1.47 H   (<0.60)  mg/L FEU


 


Ferritin   1059.4 H  (10.0-291.0)  ng/mL


 


Lactate Dehydrogenase   506 H  (120-246)  U/L


 


C-Reactive Protein   22.6 H  (0.0-0.8)  mg/dL








                      Microbiology - Last 24 Hours (Table)











 12/05/20 18:30 Blood Culture - Preliminary





 Blood    No Growth after 96 hours

## 2020-12-10 NOTE — P.PN
Progress Note - Text


Progress Note Date: 12/09/20





Chief Complaint: Cough





History of presenting complaint:


This is a 65-year-old patient of Dr. williamson.  Chronic stable medical 

conditions include hypothyroid, herniated disc.  She presented to the ER with 

complaints of weakness and shortness of breath.  Her entire family has the cold 

and a COVID test was negative.  Her pulse ox at home and was 78%.  And in the ER

it was 85%.  She is a nonsmoker.  Her primary care giver azithromycin, 

Hydrochlroquin and steroids.  She took these but the symptoms are not getting 

any better.  Symptoms have been going on for about 10 days.  She is also had 

some fever and chills.  Decreased appetite.  Decrease in taste and smell.  Had 

some headaches.  Has some diarrhea.  Also notices beige sputum.  Feels a chest 

tightness for deep breath.


Admitted with bilateral COVID pneumonia, possible gram-negative pneumonia, acute

hypoxic respiratory failure.  Started on IV Solu-Medrol Lovenox IV ceftriaxone. 

Remdesivir-added.  Patient received a dose of convalescent plasma.


Today-remains short of breath.  On 15 L of nasal cannula.  A bit tired.  Oral 

intake fair





Review of systems: Was done for constitutional, cardiovascular, GI, pulmonary. 

relevant finding as above





Current medications reviewed in today's electronic records:





Physical examination:


VITAL SIGNS: He 7.6, 74, 19, 112/74, 91% on 15 L


GENERAL: Reclining in bed, short of breath.  


PSYCH: [Alert and oriented x3;  mood  and affect anxious]l.  


NEUROLOGICAL: Cranial nerves grossly intact.  Moving all 4 limbs


Rest of the exam as per nursing, pulmonary





INVESTIGATIONS, reviewed in the clinical context:


December 9: D-dimer 1.67 CRP 25.3


December 7: D-dimer 0.53 CRP 6.7 pro-calcitonin 0.02


White count 12.6 hemoglobin 15.1 and platelets 472 increased neutrophils


d-dimer 0.38 potassium 4.6 bun 20 creatinine 0.50


CRP 63


Coronavirus PCF-detected


EKG tracing personally reviewed by me-no sinus rhythm


Chest x-ray film personally reviewed by me-bilateral scattered infiltrates





Assessment:


-Bilateral COVID 19 pneumonia - slow to respond


-Possible gram-negative pneumonia, patient has beige sputum and left shift on 

CBC-on ceftriaxone


-Acute hypoxic respiratory failure from pneumonia-slow to respond on 15 L nasal 

cannula


-Hypothyroid


-Morbid obesity BMI 41.6





Plan:


Continue IV Solu-Medrol.  Ceftriaxone, ,  High flow oxygen 15 L, given increase 

in d-dimer.  Will change to therapeutic dose of Lovenox.  Follow with pulmonary 

and ID.

## 2020-12-10 NOTE — PN
PROGRESS NOTE



DATE OF SERVICE:

12/09/2020



REASON FOR FOLLOWUP:

COVID-19 pneumonia.



INTERVAL HISTORY:

The patient is currently afebrile.  The patient is feeling slightly better today.

Still requiring high-flow nasal cannula oxygen.  Denies any chest pain.  Minimal cough.

No abdominal pain.  No diarrhea.



PHYSICAL EXAMINATION:

Blood pressure 150/81 with a pulse of 75, temperature 98.2.  She is 91% on 15 L high-

flow oxygen. General description is a middle-aged female lying in bed in no distress.

Respiratory system: Unlabored breathing with decreased intensity of the breath sounds.

No wheeze.  Heart S1, S2.  Regular rate and rhythm.

ABDOMEN:  Soft, no tenderness.



LABS:

D-dimer 1.67.  .  CRP 25.3.  Procalcitonin normal.



DIAGNOSTIC IMPRESSION AND PLAN:

Patient with acute COVID-19 pneumonia in this patient is currently being treated with

Solu-Medrol, Remdesivir and zinc, Lovenox to continue with Procalcitonin normal and no

clinical suspicions for bacterial pneumonia.  Rocephin discontinued.





MMODL / IJN: [id number] / Job#: [id number]

## 2020-12-10 NOTE — P.PN
Progress Note - Text


Progress Note Date: 12/10/20





Chief Complaint: Cough





History of presenting complaint:


This is a 65-year-old patient of Dr. williamson.  Chronic stable medical 

conditions include hypothyroid, herniated disc.  She presented to the ER with 

complaints of weakness and shortness of breath.  Her entire family has the cold 

and a COVID test was negative.  Her pulse ox at home and was 78%.  And in the ER

it was 85%.  She is a nonsmoker.  Her primary care giver azithromycin, 

Hydrochlroquin and steroids.  She took these but the symptoms are not getting 

any better.  Symptoms have been going on for about 10 days.  She is also had 

some fever and chills.  Decreased appetite.  Decrease in taste and smell.  Had 

some headaches.  Has some diarrhea.  Also notices beige sputum.  Feels a chest 

tightness for deep breath.


Admitted with bilateral COVID pneumonia, possible gram-negative pneumonia, acute

hypoxic respiratory failure.  Started on IV Solu-Medrol Lovenox IV ceftriaxone. 

Remdesivir-added.  Patient received a dose of convalescent plasma.


Today-remains short of breath.  On 15 L of nasal cannula.   tired.  Able to eat.

 Sitting up in a chair





Review of systems: Was done for constitutional, cardiovascular, GI, pulmonary. 

relevant finding as above





Active Medications





Acetaminophen (Acetaminophen Tab 325 Mg Tab)  650 mg PO Q6HR PRN


   PRN Reason: Mild Pain or Fever > 100.5


   Last Admin: 12/10/20 13:50 Dose:  650 mg


   Documented by: 


Ascorbic Acid (Ascorbic Acid 500 Mg Tab)  500 mg PO DAILY [facility]


   Last Admin: 12/10/20 09:07 Dose:  500 mg


   Documented by: 


Bupropion HCl (Bupropion Xl 150 Mg Tab.Er.24h)  150 mg PO DAILY [facility]


   Last Admin: 12/10/20 09:07 Dose:  150 mg


   Documented by: 


Cholecalciferol (Cholecalciferol 1,000 Unit Tab)  1,000 unit PO DAILY [facility]


   Last Admin: 12/10/20 09:07 Dose:  1,000 unit


   Documented by: 


Enoxaparin Sodium (Enoxaparin 100 Mg/Ml Syringe)  100 mg SQ Q12HR [facility]


   Last Admin: 12/10/20 20:24 Dose:  100 mg


   Documented by: 


Famotidine (Famotidine 20 Mg Tab)  20 mg PO BID [facility]


   Last Admin: 12/10/20 20:22 Dose:  20 mg


   Documented by: 


Gabapentin (Gabapentin 300 Mg Cap)  300 mg PO BID [facility]


   Last Admin: 12/10/20 20:22 Dose:  300 mg


   Documented by: 


Remdesivir 100 mg/ Sodium (Chloride)  250 mls @ 250 mls/hr IVPB DAILY@1200 [facility]


   Stop: 12/12/20 12:59


   Last Admin: 12/10/20 14:48 Dose:  250 mls/hr


   Documented by: 


Sodium Chloride (Saline 0.9%)  1,000 mls @ 50 mls/hr IV .Q20H [facility]


   Last Admin: 12/09/20 07:58 Dose:  50 mls/hr


   Documented by: 


Ibuprofen (Ibuprofen 400 Mg Tab)  400 mg PO Q6HR PRN


   PRN Reason: Mild Pain or Fever > 100.5


   Last Admin: 12/10/20 20:21 Dose:  400 mg


   Documented by: 


Levothyroxine Sodium (Levothyroxine 100 Mcg Tab)  100 mcg PO DAILY@0630 [facility]


   Last Admin: 12/10/20 05:08 Dose:  100 mcg


   Documented by: 


Methylprednisolone Sodium Succinate (Methylprednisolone Sod Succi 40 Mg/Ml 1 Ml 

Vial)  40 mg IV Q8HR [facility]


   Last Admin: 12/10/20 17:46 Dose:  40 mg


   Documented by: 


Naloxone HCl (Naloxone 0.4 Mg/Ml 1 Ml Vial)  0.2 mg IV Q2M PRN


   PRN Reason: Opioid Reversal


Ondansetron HCl (Ondansetron 4 Mg/2 Ml Vial)  4 mg IVP Q8HR PRN


   PRN Reason: Nausea And Vomiting


Zinc Sulfate (Zinc Sulfate 220 Mg Cap)  220 mg PO DAILY [facility]


   Last Admin: 12/10/20 09:07 Dose:  220 mg


   Documented by: 











Physical examination:


VITAL SIGNS: 98.2, 74, 18, 154/75, 94% on 15 L


GENERAL: Sitting up in a chair, short of breath.  


PSYCH: Alert and oriented x3;  mood  and affect anxious.  


NEUROLOGICAL: Cranial nerves grossly intact.  Moving  limbs


Rest of the exam as per nursing, pulmonary





INVESTIGATIONS, reviewed in the clinical context:


December 10: D-dimer 1.47 CRP 22.6


December 9: D-dimer 1.67 CRP 25.3


December 7: D-dimer 0.53 CRP 6.7 pro-calcitonin 0.02


White count 12.6 hemoglobin 15.1 and platelets 472 increased neutrophils


d-dimer 0.38 potassium 4.6 bun 20 creatinine 0.50


CRP 63


Coronavirus PCF-detected


EKG tracing personally reviewed by me-no sinus rhythm


Chest x-ray film personally reviewed by me-bilateral scattered infiltrates





Assessment:


-Bilateral COVID 19 pneumonia - slow to respond


-Possible gram-negative pneumonia, patient has beige sputum and left shift on 

CBC-on ceftriaxone


-Acute hypoxic respiratory failure from pneumonia-slow to respond , remains on 

15 L nasal cannula


-Hypothyroid


-Morbid obesity BMI 41.6





Plan:


Continue IV Solu-Medrol.  Ceftriaxone, ,  High flow oxygen 15 L,, Lovenox.  

Discussed with patient.  Follow with pulmonary and ID

## 2020-12-11 RX ADMIN — BUPROPION HYDROCHLORIDE SCH MG: 150 TABLET, FILM COATED, EXTENDED RELEASE ORAL at 09:38

## 2020-12-11 RX ADMIN — ENOXAPARIN SODIUM SCH MG: 100 INJECTION SUBCUTANEOUS at 20:42

## 2020-12-11 RX ADMIN — OXYCODONE HYDROCHLORIDE AND ACETAMINOPHEN SCH MG: 500 TABLET ORAL at 09:37

## 2020-12-11 RX ADMIN — IBUPROFEN PRN MG: 400 TABLET, FILM COATED ORAL at 04:50

## 2020-12-11 RX ADMIN — Medication SCH UNIT: at 09:37

## 2020-12-11 RX ADMIN — CEFAZOLIN SCH MLS/HR: 330 INJECTION, POWDER, FOR SOLUTION INTRAMUSCULAR; INTRAVENOUS at 04:50

## 2020-12-11 RX ADMIN — GABAPENTIN SCH MG: 300 CAPSULE ORAL at 09:37

## 2020-12-11 RX ADMIN — FAMOTIDINE SCH MG: 20 TABLET, FILM COATED ORAL at 20:42

## 2020-12-11 RX ADMIN — FAMOTIDINE SCH MG: 20 TABLET, FILM COATED ORAL at 09:37

## 2020-12-11 RX ADMIN — ENOXAPARIN SODIUM SCH MG: 100 INJECTION SUBCUTANEOUS at 09:37

## 2020-12-11 RX ADMIN — METHYLPREDNISOLONE SODIUM SUCCINATE SCH MG: 40 INJECTION, POWDER, FOR SOLUTION INTRAMUSCULAR; INTRAVENOUS at 23:24

## 2020-12-11 RX ADMIN — IBUPROFEN PRN MG: 400 TABLET, FILM COATED ORAL at 17:11

## 2020-12-11 RX ADMIN — GABAPENTIN SCH MG: 300 CAPSULE ORAL at 20:42

## 2020-12-11 RX ADMIN — METHYLPREDNISOLONE SODIUM SUCCINATE SCH MG: 40 INJECTION, POWDER, FOR SOLUTION INTRAMUSCULAR; INTRAVENOUS at 17:11

## 2020-12-11 RX ADMIN — LEVOTHYROXINE SODIUM SCH MCG: 100 TABLET ORAL at 04:50

## 2020-12-11 RX ADMIN — REMDESIVIR SCH MLS/HR: 100 INJECTION, POWDER, LYOPHILIZED, FOR SOLUTION INTRAVENOUS at 13:50

## 2020-12-11 RX ADMIN — METHYLPREDNISOLONE SODIUM SUCCINATE SCH MG: 40 INJECTION, POWDER, FOR SOLUTION INTRAMUSCULAR; INTRAVENOUS at 09:36

## 2020-12-11 RX ADMIN — Medication SCH MG: at 09:37

## 2020-12-11 NOTE — XR
EXAMINATION TYPE: XR chest 1V portable

 

DATE OF EXAM: 12/11/2020

 

COMPARISON: 12/9/2020

 

INDICATION: Follow up Covid

 

TECHNIQUE: Single frontal view of the chest is obtained.

 

FINDINGS:  

The heart size is normal.  

The pulmonary vasculature is prominent.  

Diffuse increased lung markings are present bilaterally. Findings are stable  

 

 

IMPRESSION:  

1. Stable diffuse increased lung markings bilaterally compatible with atypical pneumonia

## 2020-12-11 NOTE — P.PN
Subjective


Progress Note Date: 12/11/20








HISTORY OF PRESENT ILLNESS


This is a 65-year-old  female had onset of symptoms 10 days ago with 

fever, cough, shortness of breath.  She states for the first 6 or 7 day she was 

feeling okay but by day #8 she started feeling terrible.  She bought pulse ox 

reader and initially she was running 90% but then dropped down to 78%.  She was 

doing virtual visits with her physician's office and was put on dexamethasone, 

is azithromycin and completed course.  On recheck, patient was placed on 

hydroxychloroquine.  She denies having any abdominal pain.  No diarrhea.  She 

complains of a cough with chest pain with coughing, dark beige sputum 

production.  She complains of chest pain with deep inspiration.  She is feeling 

improvement since admission but continues to have cough and shortness of breath.

 Pulse ox is 90% on 15 L nasal cannula, heart rate 69, blood pressure 140/81.  

She has been afebrile.  W BC 12.6.  Lymphocytes normal.  Initial d-dimer 0.38 

and repeat 0.53.  Creatinine 0.5.  Ferritin level CCCLX.3.  AST 76, ALT 65, 

alkaline phosphatase 83.  LDH 1038.  C-reactive protein 63 with repeat of 6.7.  

COVID-19 positive.  Pro-calcitonin was 0.04 and repeat 0.02.  Chest x-ray 

reveals new bilateral interstitial pneumonia.  Patient has been started on 

ceftriaxone, dexamethasone 6 mg oral daily, Lovenox 40 mg subcu daily, 

supplements.





12/10: Patient is on day #3/5 of Remdesivir and status post convalescent plasma.

 Patient takes that she is feeling better.  She denies any chest pain but she 

does have pain with deep breathing.  She complains of a beige and brown sputum 

production.  She denies having any abdominal symptoms including abdominal pain, 

nausea vomiting or diarrhea.  She continues to have a little wheeze.  Repeat 

chest x-ray ordered for the morning.  Patient is currently pulse ox 94% on 15 L 

nonrebreather.  Afebrile, heart rate 74, blood pressure 154/75.  Blood culture 

shows no growth at 96 hours.





12/11: Patient states that she is feeling better.  She continues to have a cough

that seems to be deeper along with sputum production.  Sputum is brown and beige

in color.  She denies having any nausea, vomiting or diarrhea.  She states her 

breathing was kind of breath this morning but improved by this afternoon.  

Patient has been afebrile.  Pulse ox is running 89 and 90% on 15 L high flow 

nasal cannula.  Patient is day 4/5 of Remdesivir.





PHYSICAL EXAMINATION


Gen: This is a morbidly obese 65-year-old  female.


HEENT: Head is atraumatic, normocephalic. Pupils equal, round. Sclerae is 

anicteric. 


NECK: Supple. No JVD. 


LUNGS: Diminished in the bilateral bases.  Mild expiratory wheeze more so on the

right side.  No intercostal retractions.


HEART: Regular rate and rhythm. No murmur. 


ABDOMEN: Soft. Bowel sounds are present. No masses.  No tenderness.


EXTREMITIES: No pedal edema.  No calf tenderness.


NEUROLOGICAL: Patient is awake, alert and oriented x3. 





ASSESSMENT


Covid 19 pneumonia


Acute hypoxic respiratory failure


Elevated inflammatory markers





PLAN


Continue Remdesivir day #4/5


Status post convalescent plasma


Continue Solu-Medrol 40 mg IV every 8 hours, Lovenox, supplements


Continue oxygen supplementation and wean as appropriate


Blood culture in progress





The above dictated assessment and findings were discussed with Dr. Garcia.  The 

impression and plan of care have been directed as dictated.  Mary Carmen Carter nurse 

practitioner acting as scribe for Dr. Garcia.











Objective





- Vital Signs


Vital signs: 


                                   Vital Signs











Temp  98.2 F   12/11/20 11:00


 


Pulse  61   12/11/20 11:00


 


Resp  20   12/11/20 11:00


 


BP  143/87   12/11/20 11:00


 


Pulse Ox  90 L  12/11/20 11:00








                                 Intake & Output











 12/10/20 12/11/20 12/11/20





 18:59 06:59 18:59


 


Intake Total 400 1560 


 


Balance 400 1560 


 


Weight 113.398 kg  


 


Intake:   


 


  Intake, IV Titration 400 600 





  Amount   


 


    Sodium Chloride 0.9% 1, 400 600 





    000 ml @ 50 mls/hr IV .   





    Q20H [facility] Rx#:[id number]   


 


  Oral  960 


 


Other:   


 


  Voiding Method Bedside Commode Bedside Commode 


 


  # Voids  1 














- Labs


CBC & Chem 7: 


                                 12/05/20 18:30





                                 12/05/20 18:30


Labs: 


                  Abnormal Lab Results - Last 24 Hours (Table)











  12/11/20 12/11/20 Range/Units





  07:30 10:43 


 


D-Dimer   1.16 H  (<0.60)  mg/L FEU


 


C-Reactive Protein  11.7 H   (0.0-0.8)  mg/dL








                      Microbiology - Last 24 Hours (Table)











 12/05/20 18:30 Blood Culture - Preliminary





 Blood    No Growth after 120 hours

## 2020-12-11 NOTE — P.PN
Progress Note - Text


Progress Note Date: 12/11/20





Chief Complaint: Cough





History of presenting complaint:


This is a 65-year-old patient of Dr. williamson.  Chronic stable medical 

conditions include hypothyroid, herniated disc.  She presented to the ER with 

complaints of weakness and shortness of breath.  Her entire family has the cold 

and a COVID test was negative.  Her pulse ox at home and was 78%.  And in the ER

it was 85%.  She is a nonsmoker.  Her primary care giver azithromycin, 

Hydrochlroquin and steroids.  She took these but the symptoms are not getting 

any better.  Symptoms have been going on for about 10 days.  She is also had 

some fever and chills.  Decreased appetite.  Decrease in taste and smell.  Had 

some headaches.  Has some diarrhea.  Also notices beige sputum.  Feels a chest 

tightness for deep breath.


Admitted with bilateral COVID pneumonia, possible gram-negative pneumonia, acute

hypoxic respiratory failure.  Started on IV Solu-Medrol Lovenox IV ceftriaxone. 

Remdesivir-added.  Patient received a dose of convalescent plasma.


Today-remains short of breath.  On 15 L of nasal cannula.   tired.  Laying in 

bed.  Eating about 50%





Review of systems: Was done for constitutional, cardiovascular, GI, pulmonary. 

relevant finding as above





Active Medications





Acetaminophen (Acetaminophen Tab 325 Mg Tab)  650 mg PO Q6HR PRN


   PRN Reason: Mild Pain or Fever > 100.5


   Last Admin: 12/10/20 13:50 Dose:  650 mg


   Documented by: 


Ascorbic Acid (Ascorbic Acid 500 Mg Tab)  500 mg PO DAILY [facility]


   Last Admin: 12/11/20 09:37 Dose:  500 mg


   Documented by: 


Bupropion HCl (Bupropion Xl 150 Mg Tab.Er.24h)  150 mg PO DAILY [facility]


   Last Admin: 12/11/20 09:38 Dose:  150 mg


   Documented by: 


Cholecalciferol (Cholecalciferol 1,000 Unit Tab)  1,000 unit PO DAILY [facility]


   Last Admin: 12/11/20 09:37 Dose:  1,000 unit


   Documented by: 


Enoxaparin Sodium (Enoxaparin 100 Mg/Ml Syringe)  100 mg SQ Q12HR [facility]


   Last Admin: 12/11/20 20:42 Dose:  100 mg


   Documented by: 


Famotidine (Famotidine 20 Mg Tab)  20 mg PO BID [facility]


   Last Admin: 12/11/20 20:42 Dose:  20 mg


   Documented by: 


Gabapentin (Gabapentin 300 Mg Cap)  300 mg PO BID [facility]


   Last Admin: 12/11/20 20:42 Dose:  300 mg


   Documented by: 


Remdesivir 100 mg/ Sodium (Chloride)  250 mls @ 250 mls/hr IVPB DAILY@1200 [facility]


   Stop: 12/12/20 12:59


   Last Admin: 12/11/20 13:50 Dose:  250 mls/hr


   Documented by: 


Sodium Chloride (Saline 0.9%)  1,000 mls @ 50 mls/hr IV .Q20H [facility]


   Last Admin: 12/11/20 04:50 Dose:  50 mls/hr


   Documented by: 


Ibuprofen (Ibuprofen 400 Mg Tab)  400 mg PO Q6HR PRN


   PRN Reason: Mild Pain or Fever > 100.5


   Last Admin: 12/11/20 17:11 Dose:  400 mg


   Documented by: 


Levothyroxine Sodium (Levothyroxine 100 Mcg Tab)  100 mcg PO DAILY@0630 [facility]


   Last Admin: 12/11/20 04:50 Dose:  100 mcg


   Documented by: 


Methylprednisolone Sodium Succinate (Methylprednisolone Sod Succi 40 Mg/Ml 1 Ml 

Vial)  40 mg IV Q8HR [facility]


   Last Admin: 12/11/20 17:11 Dose:  40 mg


   Documented by: 


Naloxone HCl (Naloxone 0.4 Mg/Ml 1 Ml Vial)  0.2 mg IV Q2M PRN


   PRN Reason: Opioid Reversal


Ondansetron HCl (Ondansetron 4 Mg/2 Ml Vial)  4 mg IVP Q8HR PRN


   PRN Reason: Nausea And Vomiting


Zinc Sulfate (Zinc Sulfate 220 Mg Cap)  220 mg PO DAILY [facility]


   Last Admin: 12/11/20 09:37 Dose:  220 mg


   Documented by: 











Physical examination:


VITAL SIGNS: 98.2, 61, 20, 143.87, 90% on 15 L


GENERAL: Laying prone in the bed, short of breath 


PSYCH: Alert and oriented x3;  mood  and affect anxious.  


NEUROLOGICAL: Cranial nerves grossly intact.  Moving  limbs


Rest of the exam as per nursing, pulmonary





INVESTIGATIONS, reviewed in the clinical context:


December 11: D-dimer 1.16 CRP 11.7


December 10: D-dimer 1.47 CRP 22.6


December 9: D-dimer 1.67 CRP 25.3


December 7: D-dimer 0.53 CRP 6.7 pro-calcitonin 0.02


White count 12.6 hemoglobin 15.1 and platelets 472 increased neutrophils


d-dimer 0.38 potassium 4.6 bun 20 creatinine 0.50


CRP 63


Coronavirus PCF-detected


EKG tracing personally reviewed by me-no sinus rhythm


Chest x-ray film personally reviewed by me-bilateral scattered infiltrates





Assessment:


-Bilateral COVID 19 pneumonia - slow to respond


-Possible gram-negative pneumonia, patient has beige sputum and left shift on 

CBC-on ceftriaxone


-Acute hypoxic respiratory failure from pneumonia-slow to respond , remains on 

15 L nasal cannula


-Hypothyroid


-Morbid obesity BMI 41.6





Plan:


Continue IV Solu-Medrol.  Ceftriaxone, ,  High flow oxygen 15 L,, Lovenox.  

Discussed with patient.  Encouraged activity as tolerated

## 2020-12-11 NOTE — P.PN
Subjective


Progress Note Date: 12/11/20


Principal diagnosis: 





Acute bilateral pneumonia secondary to Covid 19 infection 





65-year-old male patient and the patient came into the emergency department 

yesterday because of generalized weakness shortness of breath.  The patient had 

a pulse ox of 70%.  The patient is a nonsmoker.  Apparently symptoms of been 

progressively getting worse over the past 10 days. [facility] and her symptoms were waxing and waning and progressively 

she got worse and she ultimately had come to the hospital because of worsening 

shortness of breath. He had some fever and chills.  He had diminished appetite. 

He had decreased taste and smell.  The patient tested positive for COVID 19 by 

PCR and the patient is currently being treated for this pneumonia.  The patient 

was admitted because of 12.6.  The patient has a platelet count of 472.  

Preservation.  Positive normal.  D-dimer is not elevated at 0.53.  The ferritin 

level is at 360, the patient has a mild transaminitis with an AST of 76, ALP of 

65 with an LDH of 1038.  CRP is at 63 and appropriate LEVEL IS AT 0.04.  REST X-

RAY SHOWING NEW BILATERAL INTERSTITIAL INFILTRATION PERIHILAR.  THE PATIENT IS 

CURRENTLY ON OXYGEN AND THE PATIENT IS CURRENTLY ON 15 L TO MAINTAIN A 

SATURATION ABOVE 90%.  She is afebrile. 








on today's evaluation of 12/08/2020, the patient is a bit struggling with her 

breathing.  She was able to sit up on a chair or morning.  Noted the patient was

started on a combination of oxygen delivery systems including high flow oxygen 

at 15 L nasal cannula and 100% nonrebreather facemask to maintain a saturation 

between 88-92%.  She has some limited cough.  No significant sputum production. 

She is laying comfortably in bed.  she is having some mild degree of shortness 

of breath even at rest.  She is not using her muscles of breathing.   Note that 

the patient was started on a combination of Decadron and Remdesivir I'm also 

inclining giving her a unit of convalescence as her condition. no nausea.  No 

vomiting.  No diarrhea.  No abdominal pain.  No altered mentation.





on 12/09/2020 16 the patient for a follow-up.  The patient is currently on a 15 

L high flow oxygen.  On and off she is also using the 100% nonrebreather 

facemask.  She is doing well.  No specific complaints.  She is getting less 

short of breath.  Her lungs are less achy on today's evaluation.  No nausea.  No

vomiting.  She is on day 2 of Remdesivir she is also on Decadron.  She receives 

convalescent plasma.  No nausea.  No vomiting.  No diarrhea.





On today's evaluation of 12/10/2020, the patient feels that she is doing 

slightly better.  She still on 100% nonrebreather facemask and 15 L of oxygen by

nasal cannula.  She is afebrile.  She is taken Remdesivir day #3 and Decadron.  

Her inflammatory markers show a d-dimer of 1.47, ferritin level is up to 1059, 

her LDH level is down to 509 and her CRP level is down to 22.6.  No nausea.  No 

vomiting.  No diarrhea.  She was able to sit up on a recliner all day.  

Currently she is back in bed.  No other new complaints otherwise for now.  No 

improvement in her oxygenation. Her chest x-ray from yesterday showed bilateral 

airspace disease and some more confluent density in the upper lobes.





The patient is seen today 12/11/2020 in follow-up on the regular medical floor. 

She is currently resting fairly comfortably in bed.  She's been up in the chair 

most of the day.  She is still requiring 15 L high flow nasal cannula along with

a nonrebreather to maintain O2 saturations in the 90s.  She's been afebrile.  

Hemodynamically stable.  D-dimer 1.16.  Reactive protein 11.7.  She remains on 

Lovenox 100 mg subcu every 12 hours.  Currently on IV Solu-Medrol.  Receiving 

day #4 of Remdesivir.  She was given convalescent plasma as well.  Chest x-ray 

continues to reveal stable diffuse increased lung markings bilaterally 

compatible with atypical pneumonia.





Objective





- Vital Signs


Vital signs: 


                                   Vital Signs











Temp  98.1 F   12/11/20 16:40


 


Pulse  68   12/11/20 16:40


 


Resp  19   12/11/20 16:40


 


BP  143/86   12/11/20 16:40


 


Pulse Ox  90 L  12/11/20 16:40








                                 Intake & Output











 12/10/20 12/11/20 12/11/20





 18:59 06:59 18:59


 


Intake Total 400 1560 400


 


Balance 400 1560 400


 


Weight 113.398 kg  


 


Intake:   


 


  Intake, IV Titration 400 600 400





  Amount   


 


    Sodium Chloride 0.9% 1, 400 600 400





    000 ml @ 50 mls/hr IV .   





    Q20H [facility] Rx#:[id number]   


 


  Oral  960 


 


Other:   


 


  Voiding Method Bedside Commode Bedside Commode Bedside Commode


 


  # Voids  1 














- Exam





Gen. appearance.  Pleasant 65-year-old female patient, is calm comfortable, mild

degree of respiratory distress currently on 15 L of oxygen by nasal cannula in 

addition to 100% nonrebreather facemask.


Head exam was generally normal. There was no scleral icterus or corneal arcus. 

Mucous membranes were moist.


Neck was supple and without jugular venous distension, thyromegaly, or carotid 

bruits. Carotids were easily palpable bilaterally. There was no adenopathy.


Lungs sounds are diminished with crackles in the bilateral lung bases and a few 

scattered rhonchi.


Cardiac exam revealed the PMI to be normally situated and sized. The rhythm was 

regular and no extrasystoles were noted during several minutes of auscultation. 

The first and second heart sounds were normal and physiologic splitting of the 

second heart sound was noted. There were no murmurs, rubs, clicks, or gallops.


Abdominal exam revealed normal bowel sounds. The abdomen was soft, non-tender, 

and without masses, organomegaly, or appreciable enlargement of the abdominal 

aorta.


Examination of the extremities revealed easily palpable radial, femoral and 

pedal pulses. There was no cyanosis, clubbing or edema.


Examination of the skin revealed no evidence of significant rashes, suspicious 

appearing nevi or other concerning lesions.





- Labs


CBC & Chem 7: 


                                 12/05/20 18:30





                                 12/05/20 18:30


Labs: 


                  Abnormal Lab Results - Last 24 Hours (Table)











  12/11/20 12/11/20 Range/Units





  07:30 10:43 


 


D-Dimer   1.16 H  (<0.60)  mg/L FEU


 


C-Reactive Protein  11.7 H   (0.0-0.8)  mg/dL








                      Microbiology - Last 24 Hours (Table)











 12/05/20 18:30 Blood Culture - Preliminary





 Blood    No Growth after 120 hours














Assessment and Plan


Assessment: 





1 acute bilateral pneumonia secondary to Covid 19 infection  and the patient was

probably diagnosed  and became symptomatic less than 10 days ago and the patient

will be an adequate candidate for steroids and Remdesivir #3,clinically improved

and the patient also received a dose of convalescent plasma.  Despite her 

clinical improvement, the patient's oxygenation is unchanged the patient 

continues to be on same amount of oxygen which is 100% nonrebreather with 15 L 

nasal cannula.





2 acute hypoxic respiratory failure currently on 15 L of oxygen by nasal 

cannula, in addition to 100% nonrebreather facemask.





3 elevated inflammatory markers, mild





4 obesity





5 chronic back pain





Plan





The patient was seen and evaluated by Dr. Sigala


Chest x-ray and labs reviewed


She received her fourth dose of Remdesivir


Received convalescent plasma


Remains on IV Solu-Medrol, Lovenox, vitamin supplements


She is been slow to progress


May require AirVo high flow oxygen if needed


We will continue to follow and make further recommendations based on her 

clinical status





I, the cosigning physician, performed a history & physical examination of the 

patient. Lungs sounds with by basilar crackles, scattered rhonchi.  Maintaining 

good O2 saturations in the 90s on 15 L high flow nasal cannula with 

nonrebreather mask.  I discussed the assessment and plan of care with my nurse 

practitioner, Ariella Epstein. I attest to the above note as dictated by her.

## 2020-12-12 RX ADMIN — GABAPENTIN SCH MG: 300 CAPSULE ORAL at 09:36

## 2020-12-12 RX ADMIN — GABAPENTIN SCH MG: 300 CAPSULE ORAL at 20:12

## 2020-12-12 RX ADMIN — METHYLPREDNISOLONE SODIUM SUCCINATE SCH MG: 40 INJECTION, POWDER, FOR SOLUTION INTRAMUSCULAR; INTRAVENOUS at 15:01

## 2020-12-12 RX ADMIN — FAMOTIDINE SCH MG: 20 TABLET, FILM COATED ORAL at 20:12

## 2020-12-12 RX ADMIN — IBUPROFEN PRN MG: 400 TABLET, FILM COATED ORAL at 09:53

## 2020-12-12 RX ADMIN — LEVOTHYROXINE SODIUM SCH MCG: 100 TABLET ORAL at 04:44

## 2020-12-12 RX ADMIN — Medication SCH MG: at 09:36

## 2020-12-12 RX ADMIN — ENOXAPARIN SODIUM SCH MG: 100 INJECTION SUBCUTANEOUS at 20:12

## 2020-12-12 RX ADMIN — Medication SCH UNIT: at 09:36

## 2020-12-12 RX ADMIN — FAMOTIDINE SCH MG: 20 TABLET, FILM COATED ORAL at 09:36

## 2020-12-12 RX ADMIN — CEFAZOLIN SCH MLS/HR: 330 INJECTION, POWDER, FOR SOLUTION INTRAMUSCULAR; INTRAVENOUS at 15:01

## 2020-12-12 RX ADMIN — REMDESIVIR SCH MLS/HR: 100 INJECTION, POWDER, LYOPHILIZED, FOR SOLUTION INTRAVENOUS at 13:43

## 2020-12-12 RX ADMIN — OXYCODONE HYDROCHLORIDE AND ACETAMINOPHEN SCH MG: 500 TABLET ORAL at 09:36

## 2020-12-12 RX ADMIN — ACETAMINOPHEN PRN MG: 325 TABLET, FILM COATED ORAL at 19:32

## 2020-12-12 RX ADMIN — CEFAZOLIN SCH MLS/HR: 330 INJECTION, POWDER, FOR SOLUTION INTRAMUSCULAR; INTRAVENOUS at 04:34

## 2020-12-12 RX ADMIN — BUPROPION HYDROCHLORIDE SCH MG: 150 TABLET, FILM COATED, EXTENDED RELEASE ORAL at 09:36

## 2020-12-12 RX ADMIN — ENOXAPARIN SODIUM SCH MG: 100 INJECTION SUBCUTANEOUS at 09:36

## 2020-12-12 RX ADMIN — METHYLPREDNISOLONE SODIUM SUCCINATE SCH MG: 40 INJECTION, POWDER, FOR SOLUTION INTRAMUSCULAR; INTRAVENOUS at 23:27

## 2020-12-12 RX ADMIN — METHYLPREDNISOLONE SODIUM SUCCINATE SCH MG: 40 INJECTION, POWDER, FOR SOLUTION INTRAMUSCULAR; INTRAVENOUS at 09:36

## 2020-12-12 RX ADMIN — ACETAMINOPHEN PRN MG: 325 TABLET, FILM COATED ORAL at 03:14

## 2020-12-12 NOTE — P.PN
Subjective


Progress Note Date: 12/12/20








65-year-old male patient and the patient came into the emergency department 

yesterday because of generalized weakness shortness of breath.  The patient had 

a pulse ox of 70%.  The patient is a nonsmoker.  Apparently symptoms of been 

progressively getting worse over the past 10 days. she originally got sick 

around [place] and her symptoms were waxing and waning and progressively 

she got worse and she ultimately had come to the hospital because of worsening 

shortness of breath. He had some fever and chills.  He had diminished appetite. 

He had decreased taste and smell.  The patient tested positive for COVID 19 by 

PCR and the patient is currently being treated for this pneumonia.  The patient 

was admitted because of 12.6.  The patient has a platelet count of 472.  

Preservation.  Positive normal.  D-dimer is not elevated at 0.53.  The ferritin 

level is at 360, the patient has a mild transaminitis with an AST of 76, ALP of 

65 with an LDH of 1038.  CRP is at 63 and appropriate LEVEL IS AT 0.04.  REST X-

RAY SHOWING NEW BILATERAL INTERSTITIAL INFILTRATION PERIHILAR.  THE PATIENT IS 

CURRENTLY ON OXYGEN AND THE PATIENT IS CURRENTLY ON 15 L TO MAINTAIN A 

SATURATION ABOVE 90%.  She is afebrile. 








on today's evaluation of 12/08/2020, the patient is a bit struggling with her 

breathing.  She was able to sit up on a chair or morning.  Noted the patient was

started on a combination of oxygen delivery systems including high flow oxygen 

at 15 L nasal cannula and 100% nonrebreather facemask to maintain a saturation 

between 88-92%.  She has some limited cough.  No significant sputum production. 

She is laying comfortably in bed.  she is having some mild degree of shortness 

of breath even at rest.  She is not using her muscles of breathing.   Note that 

the patient was started on a combination of Decadron and Remdesivir I'm also 

inclining giving her a unit of convalescence as her condition. no nausea.  No 

vomiting.  No diarrhea.  No abdominal pain.  No altered mentation.





on 12/09/2020 16 the patient for a follow-up.  The patient is currently on a 15 

L high flow oxygen.  On and off she is also using the 100% nonrebreather 

facemask.  She is doing well.  No specific complaints.  She is getting less 

short of breath.  Her lungs are less achy on today's evaluation.  No nausea.  No

vomiting.  She is on day 2 of Remdesivir she is also on Decadron.  She receives 

convalescent plasma.  No nausea.  No vomiting.  No diarrhea.





On today's evaluation of 12/10/2020, the patient feels that she is doing 

slightly better.  She still on 100% nonrebreather facemask and 15 L of oxygen by

nasal cannula.  She is afebrile.  She is taken Remdesivir day #3 and Decadron.  

Her inflammatory markers show a d-dimer of 1.47, ferritin level is up to 1059, 

her LDH level is down to 509 and her CRP level is down to 22.6.  No nausea.  No 

vomiting.  No diarrhea.  She was able to sit up on a recliner all day.  

Currently she is back in bed.  No other new complaints otherwise for now.  No 

improvement in her oxygenation. Her chest x-ray from yesterday showed bilateral 

airspace disease and some more confluent density in the upper lobes.





The patient is seen today 12/11/2020 in follow-up on the regular medical floor. 

She is currently resting fairly comfortably in bed.  She's been up in the chair 

most of the day.  She is still requiring 15 L high flow nasal cannula along with

a nonrebreather to maintain O2 saturations in the 90s.  She's been afebrile.  

Hemodynamically stable.  D-dimer 1.16.  Reactive protein 11.7.  She remains on 

Lovenox 100 mg subcu every 12 hours.  Currently on IV Solu-Medrol.  Receiving 

day #4 of Remdesivir.  She was given convalescent plasma as well.  Chest x-ray 

continues to reveal stable diffuse increased lung markings bilaterally 

compatible with atypical pneumonia.





On 12/12/2020, the patient is being seen in follow-up.  She remains on high flow

oxygen and she is at 60 L.  She feels that she is doing better.  She is on IV 

Solu-Medrol.  She is on  day 5 of Remdesivir she also receives convalescent 

plasma.  She is afebrile.  No new complaints for now.  She is able to sit up on 

a recliner.  She is upset and the patient is feeling slightly depressed.  His 

heart is still surgery.  He is having ongoing respiratory insufficiency and she 

is slow in recovery.  She remains on high flow oxygen for now.





Objective





- Vital Signs


Vital signs: 


                                   Vital Signs











Temp  98.2 F   12/12/20 10:20


 


Pulse  82   12/12/20 10:20


 


Resp  20   12/12/20 10:20


 


BP  125/82   12/12/20 10:20


 


Pulse Ox  84 L  12/12/20 10:20








                                 Intake & Output











 12/11/20 12/12/20 12/12/20





 18:59 06:59 18:59


 


Intake Total 400  


 


Balance 400  


 


Intake:   


 


  Intake, IV Titration 400  





  Amount   


 


    Sodium Chloride 0.9% 1, 400  





    000 ml @ 50 mls/hr IV .   





    Q20H [facility] Rx#:[id number]   


 


Other:   


 


  Voiding Method Bedside Commode Bedside Commode 


 


  # Voids  2 














- Exam








Gen. appearance she is calm comfortable mild degree of respiratory distress 

currently on high flow oxygen 60 L by nasal cannula in addition to 100% 

nonrebreather facemask.


Head exam was generally normal. There was no scleral icterus or corneal arcus. 

Mucous membranes were moist.


Neck was supple and without jugular venous distension, thyromegaly, or carotid 

bruits. Carotids were easily palpable bilaterally. There was no adenopathy.


Lungs sounds are diminished and the patient is contacted the lung bases.


Cardiac exam revealed the PMI to be normally situated and sized. The rhythm was 

regular and no extrasystoles were noted during several minutes of auscultation. 

The first and second heart sounds were normal and physiologic splitting of the 

second heart sound was noted. There were no murmurs, rubs, clicks, or gallops.


Abdomen abdomen


Abdominal exam revealed normal bowel sounds. The abdomen was soft, non-tender, 

and without masses, organomegaly, or appreciable enlargement of the abdominal 

aorta.


Examination of the extremities revealed easily palpable radial, femoral and 

pedal pulses. There was no cyanosis, clubbing or edema.


Examination of the skin revealed no evidence of significant rashes, suspicious a

ppearing nevi or other concerning lesions.








- Labs


CBC & Chem 7: 


                                 12/05/20 18:30





                                 12/05/20 18:30


Labs: 


                      Microbiology - Last 24 Hours (Table)











 12/05/20 18:30 Blood Culture - Final





 Blood    No Growth after 144 hours














Assessment and Plan


Plan: 





1 acute bilateral pneumonia secondary to Covid 19 infection and the patient is 

receiving steroids and Remdesivir #5, and the patient is currently on high flow 

oxygen at 60 L of oxygen which is 100% nonrebreather with 15 L nasal cannula.





2 acute hypoxic respiratory failure currently on 60 L of oxygen by nasal 

cannula, in addition to 100% nonrebreather facemask.





3 elevated inflammatory markers, mild





4 obesity





5 chronic back pain





Plan





Utilizing a combination Solu-Medrol, Remdesivir day #5 , vitamin C, vitamin D, 

zinc, melatonin, and the patient will be gradually wean off FiO2 as tolerated to

maintain saturation above 90%.  Provide an incentive spirometer.  Monitor 

inflammatory markers . Attempt to titrate FiO2 as tolerated..    She is 

currently stable.  Monitor the pulse ox.

## 2020-12-12 NOTE — P.PN
Progress Note - Text


Progress Note Date: 12/12/20





Chief Complaint: Short of breath History of presenting complaint:


This is a 65-year-old patient of Dr. williamson.  Chronic stable medical 

conditions include hypothyroid, herniated disc.  She presented to the ER with 

complaints of weakness and shortness of breath.  Her entire family has the cold 

and a COVID test was negative.  Her pulse ox at home and was 78%.  And in the ER

it was 85%.  She is a nonsmoker.  Her primary care giver azithromycin, 

Hydrochlroquin and steroids.  She took these but the symptoms are not getting 

any better.  Symptoms have been going on for about 10 days.  She is also had 

some fever and chills.  Decreased appetite.  Decrease in taste and smell.  Had 

some headaches.  Has some diarrhea.  Also notices beige sputum.  Feels a chest 

tightness for deep breath.


Admitted with bilateral COVID pneumonia, possible gram-negative pneumonia, acute

hypoxic respiratory failure.  Started on IV Solu-Medrol Lovenox IV ceftriaxone. 

Remdesivir-added.  Patient received a dose of convalescent plasma.


Today-remains short of breath.-Laying in bed..  Tired.  Oral intake fair.





Review of systems: Was done for constitutional, cardiovascular, GI, pulmonary. 

relevant finding as above





Active Medications





Acetaminophen (Acetaminophen Tab 325 Mg Tab)  650 mg PO Q6HR PRN


   PRN Reason: Mild Pain or Fever > 100.5


   Last Admin: 12/12/20 19:32 Dose:  650 mg


   Documented by: 


Ascorbic Acid (Ascorbic Acid 500 Mg Tab)  500 mg PO DAILY [facility]


   Last Admin: 12/12/20 09:36 Dose:  500 mg


   Documented by: 


Bupropion HCl (Bupropion Xl 150 Mg Tab.Er.24h)  150 mg PO DAILY [facility]


   Last Admin: 12/12/20 09:36 Dose:  150 mg


   Documented by: 


Cholecalciferol (Cholecalciferol 1,000 Unit Tab)  1,000 unit PO DAILY [facility]


   Last Admin: 12/12/20 09:36 Dose:  1,000 unit


   Documented by: 


Enoxaparin Sodium (Enoxaparin 100 Mg/Ml Syringe)  100 mg SQ Q12HR [facility]


   Last Admin: 12/12/20 20:12 Dose:  100 mg


   Documented by: 


Famotidine (Famotidine 20 Mg Tab)  20 mg PO BID [facility]


   Last Admin: 12/12/20 20:12 Dose:  20 mg


   Documented by: 


Gabapentin (Gabapentin 300 Mg Cap)  300 mg PO BID [facility]


   Last Admin: 12/12/20 20:12 Dose:  300 mg


   Documented by: 


Sodium Chloride (Saline 0.9%)  1,000 mls @ 50 mls/hr IV .Q20H [facility]


   Last Admin: 12/12/20 15:01 Dose:  50 mls/hr


   Documented by: 


Ibuprofen (Ibuprofen 400 Mg Tab)  400 mg PO Q6HR PRN


   PRN Reason: Mild Pain or Fever > 100.5


   Last Admin: 12/12/20 09:53 Dose:  400 mg


   Documented by: 


Levothyroxine Sodium (Levothyroxine 100 Mcg Tab)  100 mcg PO DAILY@0630 [facility]


   Last Admin: 12/12/20 04:44 Dose:  100 mcg


   Documented by: 


Methylprednisolone Sodium Succinate (Methylprednisolone Sod Succi 40 Mg/Ml 1 Ml 

Vial)  40 mg IV Q8HR [facility]


   Last Admin: 12/12/20 15:01 Dose:  40 mg


   Documented by: 


Naloxone HCl (Naloxone 0.4 Mg/Ml 1 Ml Vial)  0.2 mg IV Q2M PRN


   PRN Reason: Opioid Reversal


Ondansetron HCl (Ondansetron 4 Mg/2 Ml Vial)  4 mg IVP Q8HR PRN


   PRN Reason: Nausea And Vomiting


Zinc Sulfate (Zinc Sulfate 220 Mg Cap)  220 mg PO DAILY [facility]


   Last Admin: 12/12/20 09:36 Dose:  220 mg


   Documented by: 














Physical examination:


VITAL SIGNS: 98.2, 82, 20, 125/82, 84% on 15 L


GENERAL: Laying prone in the bed, short of breath 


PSYCH: Alert and oriented x3;  mood  and affect tired 


NEUROLOGICAL: Cranial nerves grossly intact.  Moving  limbs


Rest of the exam as per nursing, pulmonary





INVESTIGATIONS, reviewed in the clinical context:


December 12: D-dimer 1.16 CRP 11.7


December 11: D-dimer 1.16 CRP 11.7


December 10: D-dimer 1.47 CRP 22.6


December 9: D-dimer 1.67 CRP 25.3


December 7: D-dimer 0.53 CRP 6.7 pro-calcitonin 0.02


White count 12.6 hemoglobin 15.1 and platelets 472 increased neutrophils


d-dimer 0.38 potassium 4.6 bun 20 creatinine 0.50


CRP 63


Coronavirus PCF-detected


EKG tracing personally reviewed by me-no sinus rhythm


Chest x-ray film personally reviewed by me-bilateral scattered infiltrates





Assessment:


-Bilateral COVID 19 pneumonia - slow to respond


-Possible gram-negative pneumonia, patient has beige sputum and left shift on 

CBC-on ceftriaxone


-Acute hypoxic respiratory failure from pneumonia-slow to respond , remains on 

15 L nasal cannula


-Hypothyroid


-Morbid obesity BMI 41.6





Plan:


Continue IV Solu-Medrol.  Ceftriaxone, ,  High flow oxygen 15 L,, Lovenox.  

Discussed with patient.  Follow with pulmonary

## 2020-12-13 LAB
ALBUMIN SERPL-MCNC: 3.6 G/DL (ref 3.8–4.9)
ALBUMIN/GLOB SERPL: 1.57 G/DL (ref 1.6–3.17)
ALP SERPL-CCNC: 116 U/L (ref 41–126)
ALT SERPL-CCNC: 46 U/L (ref 8–44)
ANION GAP SERPL CALC-SCNC: 10.6 MMOL/L (ref 4–12)
AST SERPL-CCNC: 21 U/L (ref 13–35)
BASOPHILS # BLD AUTO: 0.1 K/UL (ref 0–0.2)
BASOPHILS NFR BLD AUTO: 1 %
BUN SERPL-SCNC: 17 MG/DL (ref 9–27)
BUN/CREAT SERPL: 28.33 RATIO (ref 12–20)
CALCIUM SPEC-MCNC: 8.4 MG/DL (ref 8.7–10.3)
CHLORIDE SERPL-SCNC: 101 MMOL/L (ref 96–109)
CO2 SERPL-SCNC: 28.4 MMOL/L (ref 21.6–31.8)
EOSINOPHIL # BLD AUTO: 0 K/UL (ref 0–0.7)
EOSINOPHIL NFR BLD AUTO: 0 %
ERYTHROCYTE [DISTWIDTH] IN BLOOD BY AUTOMATED COUNT: 4.87 M/UL (ref 3.8–5.4)
ERYTHROCYTE [DISTWIDTH] IN BLOOD: 13.4 % (ref 11.5–15.5)
GLOBULIN SER CALC-MCNC: 2.3 G/DL (ref 1.6–3.3)
GLUCOSE SERPL-MCNC: 174 MG/DL (ref 70–110)
HCT VFR BLD AUTO: 45.1 % (ref 34–46)
HGB BLD-MCNC: 14.9 GM/DL (ref 11.4–16)
LDH SPEC-CCNC: 526 U/L (ref 120–246)
LYMPHOCYTES # SPEC AUTO: 1 K/UL (ref 1–4.8)
LYMPHOCYTES NFR SPEC AUTO: 9 %
MCH RBC QN AUTO: 30.5 PG (ref 25–35)
MCHC RBC AUTO-ENTMCNC: 32.9 G/DL (ref 31–37)
MCV RBC AUTO: 92.6 FL (ref 80–100)
MONOCYTES # BLD AUTO: 0.4 K/UL (ref 0–1)
MONOCYTES NFR BLD AUTO: 3 %
NEUTROPHILS # BLD AUTO: 10.5 K/UL (ref 1.3–7.7)
NEUTROPHILS NFR BLD AUTO: 87 %
PLATELET # BLD AUTO: 512 K/UL (ref 150–450)
POTASSIUM SERPL-SCNC: 4 MMOL/L (ref 3.5–5.5)
PROT SERPL-MCNC: 5.9 G/DL (ref 6.2–8.2)
SODIUM SERPL-SCNC: 140 MMOL/L (ref 135–145)
WBC # BLD AUTO: 12.1 K/UL (ref 3.8–10.6)

## 2020-12-13 RX ADMIN — IBUPROFEN PRN MG: 400 TABLET, FILM COATED ORAL at 11:07

## 2020-12-13 RX ADMIN — ENOXAPARIN SODIUM SCH MG: 100 INJECTION SUBCUTANEOUS at 20:09

## 2020-12-13 RX ADMIN — OXYCODONE HYDROCHLORIDE AND ACETAMINOPHEN SCH MG: 500 TABLET ORAL at 09:03

## 2020-12-13 RX ADMIN — BUPROPION HYDROCHLORIDE SCH MG: 150 TABLET, FILM COATED, EXTENDED RELEASE ORAL at 09:03

## 2020-12-13 RX ADMIN — LEVOTHYROXINE SODIUM SCH MCG: 100 TABLET ORAL at 05:46

## 2020-12-13 RX ADMIN — FAMOTIDINE SCH MG: 20 TABLET, FILM COATED ORAL at 09:03

## 2020-12-13 RX ADMIN — METHYLPREDNISOLONE SODIUM SUCCINATE SCH MG: 40 INJECTION, POWDER, FOR SOLUTION INTRAMUSCULAR; INTRAVENOUS at 09:04

## 2020-12-13 RX ADMIN — ACETAMINOPHEN PRN MG: 325 TABLET, FILM COATED ORAL at 21:16

## 2020-12-13 RX ADMIN — Medication SCH MG: at 09:03

## 2020-12-13 RX ADMIN — CEFAZOLIN SCH MLS/HR: 330 INJECTION, POWDER, FOR SOLUTION INTRAMUSCULAR; INTRAVENOUS at 15:31

## 2020-12-13 RX ADMIN — Medication SCH UNIT: at 09:03

## 2020-12-13 RX ADMIN — GABAPENTIN SCH MG: 300 CAPSULE ORAL at 09:03

## 2020-12-13 RX ADMIN — METHYLPREDNISOLONE SODIUM SUCCINATE SCH MG: 40 INJECTION, POWDER, FOR SOLUTION INTRAMUSCULAR; INTRAVENOUS at 15:31

## 2020-12-13 RX ADMIN — METHYLPREDNISOLONE SODIUM SUCCINATE SCH MG: 40 INJECTION, POWDER, FOR SOLUTION INTRAMUSCULAR; INTRAVENOUS at 22:57

## 2020-12-13 RX ADMIN — GABAPENTIN SCH MG: 300 CAPSULE ORAL at 20:09

## 2020-12-13 RX ADMIN — FAMOTIDINE SCH MG: 20 TABLET, FILM COATED ORAL at 20:09

## 2020-12-13 RX ADMIN — ENOXAPARIN SODIUM SCH MG: 100 INJECTION SUBCUTANEOUS at 09:03

## 2020-12-13 NOTE — PN
PROGRESS NOTE



DATE OF SERVICE:

12/12/2020



REASON FOR FOLLOWUP:

COVID-19 pneumonia.



INTERVAL HISTORY:

Patient is currently afebrile, she is breathing slightly comfortably, however,

requiring nonrebreather high-flow oxygen.  Denies any chest pain.  Minimal cough.  No

abdominal pain, no diarrhea.



PHYSICAL EXAMINATION:

Blood pressure 150/85 with a pulse of 81, temperature 98.4. She is 88% on 15 L high-

flow oxygen. General description is a middle-aged female up in the bed in no distress.

Respiratory system: Unlabored breathing, decreased intensity of breath sounds. Heart

S1, S2.  Regular rate and rhythm. ABDOMEN: Soft. No tenderness.



LABS:

D-dimer is down to 1.16. White count shows a downward trend.



DIAGNOSTIC IMPRESSION AND PLAN:

Patient with acute COVID-19 pneumonia, currently being treated with Remdesivir, Lovenox

Solu-Medrol, and zinc sulfate to continue and monitor clinical course closely.

Continue supportive care. Thank you.





JEANNIE / CHIOMAN: [id number] / Job#: [id number]

## 2020-12-13 NOTE — P.PN
Progress Note - Text


Progress Note Date: 12/13/20





Chief Complaint: Short of breath History of presenting complaint:


This is a 65-year-old patient of Dr. williamson.  Chronic stable medical 

conditions include hypothyroid, herniated disc.  She presented to the ER with 

complaints of weakness and shortness of breath.  Her entire family has the cold 

and a COVID test was negative.  Her pulse ox at home and was 78%.  And in the ER

it was 85%.  She is a nonsmoker.  Her primary care giver azithromycin, 

Hydrochlroquin and steroids.  She took these but the symptoms are not getting 

any better.  Symptoms have been going on for about 10 days.  She is also had 

some fever and chills.  Decreased appetite.  Decrease in taste and smell.  Had 

some headaches.  Has some diarrhea.  Also notices beige sputum.  Feels a chest 

tightness for deep breath.


Admitted with bilateral COVID pneumonia, possible gram-negative pneumonia, acute

hypoxic respiratory failure.  Started on IV Solu-Medrol Lovenox IV ceftriaxone. 

Remdesivir-added.  Patient received a dose of convalescent plasma.


Today-feels a shade better today.  Sitting of the edge of the bed.  Oral intake 

improving.  Short of breath..





Review of systems: Was done for constitutional, cardiovascular, GI, pulmonary. 

relevant finding as above





Active Medications





Acetaminophen (Acetaminophen Tab 325 Mg Tab)  650 mg PO Q6HR PRN


   PRN Reason: Mild Pain or Fever > 100.5


   Last Admin: 12/12/20 19:32 Dose:  650 mg


   Documented by: 


Ascorbic Acid (Ascorbic Acid 500 Mg Tab)  500 mg PO DAILY [facility]


   Last Admin: 12/13/20 09:03 Dose:  500 mg


   Documented by: 


Bupropion HCl (Bupropion Xl 150 Mg Tab.Er.24h)  150 mg PO DAILY [facility]


   Last Admin: 12/13/20 09:03 Dose:  150 mg


   Documented by: 


Cholecalciferol (Cholecalciferol 1,000 Unit Tab)  1,000 unit PO DAILY [facility]


   Last Admin: 12/13/20 09:03 Dose:  1,000 unit


   Documented by: 


Enoxaparin Sodium (Enoxaparin 100 Mg/Ml Syringe)  100 mg SQ Q12HR [facility]


   Last Admin: 12/13/20 20:09 Dose:  100 mg


   Documented by: 


Famotidine (Famotidine 20 Mg Tab)  20 mg PO BID [facility]


   Last Admin: 12/13/20 20:09 Dose:  20 mg


   Documented by: 


Gabapentin (Gabapentin 300 Mg Cap)  300 mg PO BID [facility]


   Last Admin: 12/13/20 20:09 Dose:  300 mg


   Documented by: 


Sodium Chloride (Saline 0.9%)  1,000 mls @ 50 mls/hr IV .Q20H [facility]


   Last Admin: 12/13/20 15:31 Dose:  50 mls/hr


   Documented by: 


Ibuprofen (Ibuprofen 400 Mg Tab)  400 mg PO Q6HR PRN


   PRN Reason: Mild Pain or Fever > 100.5


   Last Admin: 12/13/20 11:07 Dose:  400 mg


   Documented by: 


Levothyroxine Sodium (Levothyroxine 100 Mcg Tab)  100 mcg PO DAILY@0630 [facility]


   Last Admin: 12/13/20 05:46 Dose:  100 mcg


   Documented by: 


Methylprednisolone Sodium Succinate (Methylprednisolone Sod Succi 40 Mg/Ml 1 Ml 

Vial)  40 mg IV Q8HR [facility]


   Last Admin: 12/13/20 15:31 Dose:  40 mg


   Documented by: 


Naloxone HCl (Naloxone 0.4 Mg/Ml 1 Ml Vial)  0.2 mg IV Q2M PRN


   PRN Reason: Opioid Reversal


Ondansetron HCl (Ondansetron 4 Mg/2 Ml Vial)  4 mg IVP Q8HR PRN


   PRN Reason: Nausea And Vomiting


Oxymetazoline HCl (Oxymetazoline 0.05% Nasl Spray 1 Spray Bottle)  3 spray NASAL

BID PRN


   PRN Reason: CONGESTION


   Last Admin: 12/13/20 13:05 Dose:  3 spray


   Documented by: 


Zinc Sulfate (Zinc Sulfate 220 Mg Cap)  220 mg PO DAILY [facility]


   Last Admin: 12/13/20 09:03 Dose:  220 mg


   Documented by: 




















Physical examination:


VITAL SIGNS: 98.4, 89, 22, 131/73, 95% on airvo-90%


GENERAL: Sitting of the edge of the bed, tripod, short of breath 


PSYCH: Alert and oriented x3;  mood  and affect tired 


NEUROLOGICAL: Cranial nerves grossly intact.  Moving  limbs


Rest of the exam as per nursing, pulmonary





INVESTIGATIONS, reviewed in the clinical context:


December 13: D-dimer 1.17 CRP 16.7 potassium 4 creatinine 0.6


December 12: D-dimer 1.16 CRP 11.7


December 11: D-dimer 1.16 CRP 11.7


December 10: D-dimer 1.47 CRP 22.6


December 9: D-dimer 1.67 CRP 25.3


December 7: D-dimer 0.53 CRP 6.7 pro-calcitonin 0.02


White count 12.6 hemoglobin 15.1 and platelets 472 increased neutrophils


d-dimer 0.38 potassium 4.6 bun 20 creatinine 0.50


CRP 63


Coronavirus PCF-detected


EKG tracing personally reviewed by me-no sinus rhythm


Chest x-ray film personally reviewed by me-bilateral scattered infiltrates





Assessment:


-Bilateral COVID 19 pneumonia - slow to respond


-Possible gram-negative pneumonia, patient has beige sputum and left shift on 

CBC-on ceftriaxone


-Acute hypoxic respiratory failure from pneumonia-slow to respond , remains on 

15 L nasal cannula


-Hypothyroid


-Morbid obesity BMI 41.6





Plan:


Continue IV Solu-Medrol.  Ceftriaxone, , airvo 90%,, Lovenox.  Discussed with 

patient. and pulmonary

## 2020-12-13 NOTE — P.PN
Subjective


Progress Note Date: 12/13/20








65-year-old male patient and the patient came into the emergency department 

yesterday because of generalized weakness shortness of breath.  The patient had 

a pulse ox of 70%.  The patient is a nonsmoker.  Apparently symptoms of been 

progressively getting worse over the past 10 days. she originally got sick 

around Thanksgiving and her symptoms were waxing and waning and progressively 

she got worse and she ultimately had come to the hospital because of worsening 

shortness of breath. He had some fever and chills.  He had diminished appetite. 

He had decreased taste and smell.  The patient tested positive for COVID 19 by 

PCR and the patient is currently being treated for this pneumonia.  The patient 

was admitted because of 12.6.  The patient has a platelet count of 472.  

Preservation.  Positive normal.  D-dimer is not elevated at 0.53.  The ferritin 

level is at 360, the patient has a mild transaminitis with an AST of 76, ALP of 

65 with an LDH of 1038.  CRP is at 63 and appropriate LEVEL IS AT 0.04.  REST X-

RAY SHOWING NEW BILATERAL INTERSTITIAL INFILTRATION PERIHILAR.  THE PATIENT IS 

CURRENTLY ON OXYGEN AND THE PATIENT IS CURRENTLY ON 15 L TO MAINTAIN A 

SATURATION ABOVE 90%.  She is afebrile. 








on today's evaluation of 12/08/2020, the patient is a bit struggling with her 

breathing.  She was able to sit up on a chair or morning.  Noted the patient was

started on a combination of oxygen delivery systems including high flow oxygen 

at 15 L nasal cannula and 100% nonrebreather facemask to maintain a saturation 

between 88-92%.  She has some limited cough.  No significant sputum production. 

She is laying comfortably in bed.  she is having some mild degree of shortness 

of breath even at rest.  She is not using her muscles of breathing.   Note that 

the patient was started on a combination of Decadron and Remdesivir I'm also 

inclining giving her a unit of convalescence as her condition. no nausea.  No 

vomiting.  No diarrhea.  No abdominal pain.  No altered mentation.





on 12/09/2020 16 the patient for a follow-up.  The patient is currently on a 15 

L high flow oxygen.  On and off she is also using the 100% nonrebreather 

facemask.  She is doing well.  No specific complaints.  She is getting less 

short of breath.  Her lungs are less achy on today's evaluation.  No nausea.  No

vomiting.  She is on day 2 of Remdesivir she is also on Decadron.  She receives 

convalescent plasma.  No nausea.  No vomiting.  No diarrhea.





On today's evaluation of 12/10/2020, the patient feels that she is doing 

slightly better.  She still on 100% nonrebreather facemask and 15 L of oxygen by

nasal cannula.  She is afebrile.  She is taken Remdesivir day #3 and Decadron.  

Her inflammatory markers show a d-dimer of 1.47, ferritin level is up to 1059, 

her LDH level is down to 509 and her CRP level is down to 22.6.  No nausea.  No 

vomiting.  No diarrhea.  She was able to sit up on a recliner all day.  

Currently she is back in bed.  No other new complaints otherwise for now.  No 

improvement in her oxygenation. Her chest x-ray from yesterday showed bilateral 

airspace disease and some more confluent density in the upper lobes.





The patient is seen today 12/11/2020 in follow-up on the regular medical floor. 

She is currently resting fairly comfortably in bed.  She's been up in the chair 

most of the day.  She is still requiring 15 L high flow nasal cannula along with

a nonrebreather to maintain O2 saturations in the 90s.  She's been afebrile.  

Hemodynamically stable.  D-dimer 1.16.  Reactive protein 11.7.  She remains on 

Lovenox 100 mg subcu every 12 hours.  Currently on IV Solu-Medrol.  Receiving 

day #4 of Remdesivir.  She was given convalescent plasma as well.  Chest x-ray 

continues to reveal stable diffuse increased lung markings bilaterally 

compatible with atypical pneumonia.





On 12/12/2020, the patient is being seen in follow-up.  She remains on high flow

oxygen and she is at 60 L.  She feels that she is doing better.  She is on IV 

Solu-Medrol.  She is on  day 5 of Remdesivir she also receives convalescent 

plasma.  She is afebrile.  No new complaints for now.  She is able to sit up on 

a recliner.  She is upset and the patient is feeling slightly depressed.   He is

having ongoing respiratory insufficiency and she is slow in recovery.  She 

remains on high flow oxygen for now.





On 12/15/2020, the patient remains on 60 L.  Unfortunately, there is no major 

improvement in the patient's oxygenation and she is at the [place].  She is 

still short of breath with activity.  Sometimes shortness of breath with rest.  

She remains on IV Solu-Medrol.  She  completed Remdesivir she also received 

convalescent plasma.  She is afebrile..  Blood work from today shows normal 

electrolytes and sodium level is at 140 with a BUN of 17 and a creatinine of 

0.6.  Her ferritin level from 12/10/2020 was 1059.  Her CRP level from today is 

at 16.7 and her LDH is at 526.  Note that the levels are gradually declining.  

No nausea.  No vomiting.  No diarrhea.  No altered mentation.  Her last chest x-

ray was done on 12/11/2020 showed stable bilateral pulmonary infiltrates.





Objective





- Vital Signs


Vital signs: 


                                   Vital Signs











Temp  98.4 F   12/13/20 11:00


 


Pulse  89   12/13/20 11:00


 


Resp  22   12/13/20 11:00


 


BP  131/73   12/13/20 11:00


 


Pulse Ox  95   12/13/20 11:00








                                 Intake & Output











 12/12/20 12/13/20 12/13/20





 18:59 06:59 18:59


 


Other:   


 


  Voiding Method  Bedside Commode 


 


  # Voids  1 














- Exam








Gen. appearance she is calm comfortable mild degree of respiratory distress 

currently on high flow oxygen 60 L by nasal cannula in addition to 100% 

nonrebreather facemask.


Head exam was generally normal. There was no scleral icterus or corneal arcus. 

Mucous membranes were moist.


Neck was supple and without jugular venous distension, thyromegaly, or carotid 

bruits. Carotids were easily palpable bilaterally. There was no adenopathy.


Lungs sounds are diminished and the patient is contacted the lung bases.


Cardiac exam revealed the PMI to be normally situated and sized. The rhythm was 

regular and no extrasystoles were noted during several minutes of auscultation. 

The first and second heart sounds were normal and physiologic splitting of the 

second heart sound was noted. There were no murmurs, rubs, clicks, or gallops.


Abdomen abdomen


Abdominal exam revealed normal bowel sounds. The abdomen was soft, non-tender, 

and without masses, organomegaly, or appreciable enlargement of the abdominal 

aorta.


Examination of the extremities revealed easily palpable radial, femoral and 

pedal pulses. There was no cyanosis, clubbing or edema.


Examination of the skin revealed no evidence of significant rashes, suspicious 

appearing nevi or other concerning lesions.








- Labs


CBC & Chem 7: 


                                 12/13/20 06:40





                                 12/13/20 06:45


Labs: 


                  Abnormal Lab Results - Last 24 Hours (Table)











  12/13/20 12/13/20 12/13/20 Range/Units





  06:40 06:40 06:45 


 


WBC  12.1 H    (3.8-10.6)  k/uL


 


Plt Count  512 H    (150-450)  k/uL


 


Neutrophils #  10.5 H    (1.3-7.7)  k/uL


 


D-Dimer   1.17 H   (<0.60)  mg/L FEU


 


BUN/Creatinine Ratio    28.33 H  (12.00-20.00)  Ratio


 


Glucose    174 H  ()  mg/dL


 


Calcium    8.4 L  (8.7-10.3)  mg/dL


 


ALT    46 H  (8-44)  U/L


 


Lactate Dehydrogenase    526 H  (120-246)  U/L


 


C-Reactive Protein    16.7 H  (0.0-0.8)  mg/dL


 


Total Protein    5.9 L  (6.2-8.2)  g/dL


 


Albumin    3.60 L  (3.80-4.90)  g/dL


 


Albumin/Globulin Ratio    1.57 L  (1.60-3.17)  g/dL














Assessment and Plan


Plan: 





1 acute bilateral pneumonia secondary to Covid 19 infection and the patient is 

receiving steroids and Remdesivir #5, and the patient is currently on high flow 

oxygen at 60 L of oxygen which is 100% nonrebreather with 15 L nasal cannula.





2 acute hypoxic respiratory failure currently on 60 L of oxygen by nasal 

cannula, in addition to 100% nonrebreather facemask.





3 elevated inflammatory markers, mild





4 obesity





5 chronic back pain





Plan





Utilizing a combination Solu-Medrol, Remdesivir day #5 , vitamin C, vitamin D, 

zinc, melatonin, and the patient will be gradually wean off FiO2 as tolerated to

maintain saturation above 90%.  Provide an incentive spirometer.  Monitor 

inflammatory markers . Attempt to titrate FiO2 as tolerated.. Evidence of wean 

down the FiO2 any further.  Currently she is suspected 60 L.  Clinically 

however, she is feeling better and she has more energy.    She is currently 

stable.  Monitor the pulse ox.

## 2020-12-14 RX ADMIN — METHYLPREDNISOLONE SODIUM SUCCINATE SCH MG: 40 INJECTION, POWDER, FOR SOLUTION INTRAMUSCULAR; INTRAVENOUS at 08:08

## 2020-12-14 RX ADMIN — GABAPENTIN SCH MG: 300 CAPSULE ORAL at 08:08

## 2020-12-14 RX ADMIN — METHYLPREDNISOLONE SODIUM SUCCINATE SCH MG: 40 INJECTION, POWDER, FOR SOLUTION INTRAMUSCULAR; INTRAVENOUS at 20:52

## 2020-12-14 RX ADMIN — ACETAMINOPHEN PRN MG: 325 TABLET, FILM COATED ORAL at 20:47

## 2020-12-14 RX ADMIN — GABAPENTIN SCH MG: 300 CAPSULE ORAL at 20:47

## 2020-12-14 RX ADMIN — FAMOTIDINE SCH MG: 20 TABLET, FILM COATED ORAL at 08:09

## 2020-12-14 RX ADMIN — ENOXAPARIN SODIUM SCH MG: 100 INJECTION SUBCUTANEOUS at 08:09

## 2020-12-14 RX ADMIN — FAMOTIDINE SCH MG: 20 TABLET, FILM COATED ORAL at 20:47

## 2020-12-14 RX ADMIN — METHYLPREDNISOLONE SODIUM SUCCINATE SCH MG: 40 INJECTION, POWDER, FOR SOLUTION INTRAMUSCULAR; INTRAVENOUS at 16:09

## 2020-12-14 RX ADMIN — BUPROPION HYDROCHLORIDE SCH MG: 150 TABLET, FILM COATED, EXTENDED RELEASE ORAL at 08:09

## 2020-12-14 RX ADMIN — ENOXAPARIN SODIUM SCH MG: 100 INJECTION SUBCUTANEOUS at 20:50

## 2020-12-14 RX ADMIN — Medication SCH MG: at 08:08

## 2020-12-14 RX ADMIN — Medication SCH UNIT: at 08:09

## 2020-12-14 RX ADMIN — LEVOTHYROXINE SODIUM SCH MCG: 100 TABLET ORAL at 05:14

## 2020-12-14 RX ADMIN — IBUPROFEN PRN MG: 400 TABLET, FILM COATED ORAL at 13:17

## 2020-12-14 RX ADMIN — OXYCODONE HYDROCHLORIDE AND ACETAMINOPHEN SCH MG: 500 TABLET ORAL at 08:09

## 2020-12-14 NOTE — PN
PROGRESS NOTE



DATE OF SERVICE:

12/13/2020



REASON FOR FOLLOWUP:

COVID-19 pneumonia.



INTERVAL HISTORY:

The patient is currently afebrile.  Still complaining of shortness of breath on minimal

exertion.  The patient denies having any chest pain.  She did have minimal cough.  No

nausea, no vomiting. No abdominal pain or diarrhea.  Still requiring high-flow oxygen.



PHYSICAL EXAMINATION:

Blood pressure 140/79 with pulse of 75, temperature 97.9.  She is 90% on 60% FiO2.

General description is a middle-aged female up in the bed in no distress.

RESPIRATORY SYSTEM:  Unlabored breathing, a few coarse crackles at the right base.

HEART: S1, S2.  Regular rate and rhythm.

ABDOMEN:  Soft, no tenderness.



LABS:

Hemoglobin is 14.9, white count 12.1. D-dimer is 1.17. Creatinine 0.6.  CRP is down to

16.7.



DIAGNOSTIC IMPRESSION AND PLAN:

Patient with acute COVID-19 pneumonia. Patient has completed her 5-day course of

Remdesivir, currently on Solu-Medrol, Lovenox, zinc sulfate along with respiratory

support and monitor clinical course closely.





MMODL / IJN: [id number] / Job#: [id number]

## 2020-12-14 NOTE — XR
EXAMINATION TYPE: XR chest 1V portable

 

DATE OF EXAM: 12/14/2020

 

HISTORY: Shortness of breath.

 

COMPARISON: 12/11/2020

 

TECHNIQUE: Single view of the chest is submitted.

 

FINDINGS:

Demonstrated are scattered senescent parenchymal change.  

 

Patchy bilateral infiltrates persist without significant interval change.

 

The heart is stable.

 

Hilar and mediastinal structures are within normal limits.  

 

Degenerative changes are seen of the dorsal spine. 

 

 IMPRESSION: 

 

1.  Patchy bilateral infiltrates persist without significant interval change.

## 2020-12-14 NOTE — P.PN
Progress Note - Text


Progress Note Date: 12/14/20





Chief Complaint: Short of breath History of presenting complaint:


This is a 65-year-old patient of Dr. williamson.  Chronic stable medical 

conditions include hypothyroid, herniated disc.  She presented to the ER with 

complaints of weakness and shortness of breath.  Her entire family has the cold 

and a COVID test was negative.  Her pulse ox at home and was 78%.  And in the ER

it was 85%.  She is a nonsmoker.  Her primary care giver azithromycin, 

Hydrochlroquin and steroids.  She took these but the symptoms are not getting 

any better.  Symptoms have been going on for about 10 days.  She is also had 

some fever and chills.  Decreased appetite.  Decrease in taste and smell.  Had 

some headaches.  Has some diarrhea.  Also notices beige sputum.  Feels a chest 

tightness for deep breath.


Admitted with bilateral COVID pneumonia, possible gram-negative pneumonia, acute

hypoxic respiratory failure.  Started on IV Solu-Medrol Lovenox IV ceftriaxone. 

Remdesivir-added.  Patient received a dose of convalescent plasma.


Today-sitting at the edge of the bed.  Using high flow AIRVO.  Short of breath. 

Did eat a bit.  Tired.  Had a bout of increased respiratory distress this 

morning..





Review of systems: Was done for constitutional, cardiovascular, GI, pulmonary. 

relevant finding as above





Active Medications





Acetaminophen (Acetaminophen Tab 325 Mg Tab)  650 mg PO Q6HR PRN


   PRN Reason: Mild Pain or Fever > 100.5


   Last Admin: 12/14/20 20:47 Dose:  650 mg


   Documented by: 


Ascorbic Acid (Ascorbic Acid 500 Mg Tab)  500 mg PO DAILY [facility]


   Last Admin: 12/14/20 08:09 Dose:  500 mg


   Documented by: 


Bupropion HCl (Bupropion Xl 150 Mg Tab.Er.24h)  150 mg PO DAILY [facility]


   Last Admin: 12/14/20 08:09 Dose:  150 mg


   Documented by: 


Cholecalciferol (Cholecalciferol 1,000 Unit Tab)  1,000 unit PO DAILY [facility]


   Last Admin: 12/14/20 08:09 Dose:  1,000 unit


   Documented by: 


Enoxaparin Sodium (Enoxaparin 100 Mg/Ml Syringe)  100 mg SQ Q12HR [facility]


   Last Admin: 12/14/20 20:50 Dose:  100 mg


   Documented by: 


Famotidine (Famotidine 20 Mg Tab)  20 mg PO BID [facility]


   Last Admin: 12/14/20 20:47 Dose:  20 mg


   Documented by: 


Gabapentin (Gabapentin 300 Mg Cap)  300 mg PO BID [facility]


   Last Admin: 12/14/20 20:47 Dose:  300 mg


   Documented by: 


Ibuprofen (Ibuprofen 400 Mg Tab)  400 mg PO Q6HR PRN


   PRN Reason: Mild Pain or Fever > 100.5


   Last Admin: 12/14/20 13:17 Dose:  400 mg


   Documented by: 


Levothyroxine Sodium (Levothyroxine 100 Mcg Tab)  100 mcg PO DAILY@0630 [facility]


   Last Admin: 12/14/20 05:14 Dose:  100 mcg


   Documented by: 


Methylprednisolone Sodium Succinate (Methylprednisolone Sod Succi 40 Mg/Ml 1 Ml 

Vial)  40 mg IV Q8HR [facility]


   Last Admin: 12/14/20 20:52 Dose:  40 mg


   Documented by: 


Naloxone HCl (Naloxone 0.4 Mg/Ml 1 Ml Vial)  0.2 mg IV Q2M PRN


   PRN Reason: Opioid Reversal


Ondansetron HCl (Ondansetron 4 Mg/2 Ml Vial)  4 mg IVP Q8HR PRN


   PRN Reason: Nausea And Vomiting


Oxymetazoline HCl (Oxymetazoline 0.05% Nasl Spray 1 Spray Bottle)  3 spray NASAL

BID PRN


   PRN Reason: CONGESTION


   Last Admin: 12/13/20 13:05 Dose:  3 spray


   Documented by: 


Zinc Sulfate (Zinc Sulfate 220 Mg Cap)  220 mg PO DAILY [facility]


   Last Admin: 12/14/20 08:08 Dose:  220 mg


   Documented by: 

















Physical examination:


VITAL SIGNS: 96.8, 70, 20, 153 with 71, 91% on 15 L


GENERAL: Sitting of the edge of the bed, tripod, short of breath 


PSYCH: Alert and oriented x3;  mood  and affect tired 


NEUROLOGICAL: Cranial nerves grossly intact.  Moving  limbs


Rest of the exam as per nursing, pulmonary





INVESTIGATIONS, reviewed in the clinical context:


December 14: D-dimer 1.21 CRP 7.9


December 13: D-dimer 1.17 CRP 16.7 potassium 4 creatinine 0.6


December 12: D-dimer 1.16 CRP 11.7


December 11: D-dimer 1.16 CRP 11.7


December 10: D-dimer 1.47 CRP 22.6


December 9: D-dimer 1.67 CRP 25.3


December 7: D-dimer 0.53 CRP 6.7 pro-calcitonin 0.02


White count 12.6 hemoglobin 15.1 and platelets 472 increased neutrophils


d-dimer 0.38 potassium 4.6 bun 20 creatinine 0.50


CRP 63


Coronavirus PCF-detected


EKG tracing personally reviewed by me-no sinus rhythm


Chest x-ray film personally reviewed by me-bilateral scattered infiltrates





Assessment:


-Bilateral COVID 19 pneumonia - slow to respond


-Possible gram-negative pneumonia, patient has beige sputum and left shift on 

CBC-on ceftriaxone initially then discontinued


-Acute hypoxic respiratory failure from pneumonia-slow to respond , remains on 

15 L nasal cannula


-Hypothyroid


-Morbid obesity BMI 41.6





Plan:


Continue IV Solu-Medrol.  , airvo 90%,, Lovenox.  Discussed with patient.  Use 

incentive spirometry.

## 2020-12-14 NOTE — PN
PROGRESS NOTE



DATE OF SERVICE:

12/14/2020



REASON FOR FOLLOWUP:

COVID-19 infection.



INTERVAL HISTORY:

The patient is currently afebrile.  The patient is still having shortness of breath on

minimal exertion and even at rest. The patient denies having any chest pain, though.

Minimal cough which is dry.  No nausea, no vomiting.  No abdominal pain or diarrhea.



PHYSICAL EXAMINATION:

Blood pressure 135/81 with a pulse of 73, temperature 97.2.  She is 92% on 15 L non-

rebreather.

General description is a middle-aged female up in the bed in no distress.

RESPIRATORY SYSTEM: Unlabored breathing with decreased intensity of breath sounds. No

wheeze.

HEART: S1, S2.  Regular rate and rhythm.

ABDOMEN: Soft. No tenderness.



LAB:

D-dimer is 1.21.  CRP is 7.9.



DIAGNOSTIC IMPRESSION AND PLAN:

Patient with acute COVID-19 infection.  Chest x-ray with patchy bilateral infiltrates

with no significant interval change. The patient has completed her 5-day course of

remdesivir, currently on Lovenox, Solu-Medrol, zinc; to continue along with respiratory

support.  Monitor clinical course closely.





MMODL / IJN: [id number] / Job#: [id number]

## 2020-12-14 NOTE — PN
PROGRESS NOTE



A 65-year-old female admitted way back on December 5.  The patient has a history
of

COVID-19 pneumonia/pneumonitis, with severe hypoxemic respiratory failure.  
Currently,

the patient is on AIRVO at 60 L/minutes and a FiO2 of 90%.  She is getting 
saline at 20

mL an hour.  She is only minimally better.  She is sitting on the side of the 
bed.  She

is leaned over her bedside table.  She is obviously quite tachypneic and 
dyspneic.

Mild conversational dyspnea.  No audible wheezing.  No use of accessory muscles.

Again, the patient states that she is only minimally better now than she was 
when she

first came.



PHYSICAL EXAMINATION:

VITAL SIGNS: Current vital signs are reviewed temperature is 96.8, heart rate 
70,

respiratory rate blood pressure 153/71 mean 98 saturations are 91%.  This is on 
AIRVO

at 60 L/minutes and 90% FiO2.  Appears mildly tachypneic and dyspneic.

HEENT: Examination is grossly unremarkable.  Nasal cannula noted.

NECK:  Supple full range of motion.  No adenopathy, thyromegaly or neck vein

distention.

CARDIOVASCULAR: Examination reveals regular rhythm rate.  Heart rate 70.  S1, S2

normal.  No S3, S4, or murmur.

LUNGS: Reveal diffuse coarse rhonchi.  No wheezes.  A few scattered crackles.  
Breath

sounds equal.

ABDOMEN:  Obese, bowel sounds are heard.

EXTREMITIES:  Intact.  No cyanosis, clubbing, or edema.

SKIN: Without rash.

NEUROLOGIC: Examination is brief but nonfocal.



CURRENT LAB DATA:

Includes a white count of 12.1, hemoglobin, hematocrit, room were normal.  
Platelet

count 512,000.  D-dimer from today is 1.21.  No additional labs from today 
except other

than a C-reactive protein which is 7.9, down from 16.7.  The labs from yesterday
are

reviewed.

Microbiologic studies are all negative thus far.



The patient did have a chest x-ray today.  The chest x-ray shows diffuse patchy

infiltrates bilaterally.



CURRENT MEDICATIONS:

Reviewed.  The patient is on zinc, Afrin nasal spray, Zofran Narcan, Solu-
Medrol,

levothyroxine, Motrin, Neurontin, Pepcid Lovenox, vitamin D3, Wellbutrin, 
ascorbic

acid, and Tylenol.





Assessment.



1. Acute hypoxemic respiratory failure secondary to severe bilateral COVID-19

    pneumonitis/pneumonia.  The patient currently on air well at 60 L/minute 
with a

    FiO2 of 90%.  And has received her total Jayesh allotment of Remdesivir.

2. Acute hypoxemic respiratory failure.

3. Elevated inflammatory markers secondary to COVID-19 infection.

4. Obesity.

5. Chronic back pain.



PLAN:

The patient has completed her 5 days of Remdesivir.  She remains on vitamin C, 
vitamin

D3, and zinc.  She remains on corticosteroids.  Additional recommendations and

suggestions are forthcoming.  She is still quite hypoxemic.  Chest x-ray shows 
diffuse

bilateral infiltrates.  Prognosis is guarded.





MMODL / IJN: [id number] / Job#: [id number]

MTDD

## 2020-12-15 PROCEDURE — 5A09557 ASSISTANCE WITH RESPIRATORY VENTILATION, GREATER THAN 96 CONSECUTIVE HOURS, CONTINUOUS POSITIVE AIRWAY PRESSURE: ICD-10-PCS

## 2020-12-15 RX ADMIN — GABAPENTIN SCH MG: 300 CAPSULE ORAL at 09:00

## 2020-12-15 RX ADMIN — FAMOTIDINE SCH MG: 20 TABLET, FILM COATED ORAL at 09:00

## 2020-12-15 RX ADMIN — IBUPROFEN PRN MG: 400 TABLET, FILM COATED ORAL at 11:26

## 2020-12-15 RX ADMIN — ENOXAPARIN SODIUM SCH MG: 100 INJECTION SUBCUTANEOUS at 09:01

## 2020-12-15 RX ADMIN — Medication SCH UNIT: at 09:01

## 2020-12-15 RX ADMIN — BUPROPION HYDROCHLORIDE SCH MG: 150 TABLET, FILM COATED, EXTENDED RELEASE ORAL at 09:01

## 2020-12-15 RX ADMIN — LEVOTHYROXINE SODIUM SCH MCG: 100 TABLET ORAL at 05:51

## 2020-12-15 RX ADMIN — ENOXAPARIN SODIUM SCH MG: 100 INJECTION SUBCUTANEOUS at 21:11

## 2020-12-15 RX ADMIN — METHYLPREDNISOLONE SODIUM SUCCINATE SCH MG: 40 INJECTION, POWDER, FOR SOLUTION INTRAMUSCULAR; INTRAVENOUS at 12:18

## 2020-12-15 RX ADMIN — OXYCODONE HYDROCHLORIDE AND ACETAMINOPHEN SCH MG: 500 TABLET ORAL at 09:00

## 2020-12-15 RX ADMIN — IBUPROFEN PRN MG: 400 TABLET, FILM COATED ORAL at 21:13

## 2020-12-15 RX ADMIN — ACETAMINOPHEN PRN MG: 325 TABLET, FILM COATED ORAL at 09:01

## 2020-12-15 RX ADMIN — GABAPENTIN SCH MG: 300 CAPSULE ORAL at 21:10

## 2020-12-15 RX ADMIN — Medication SCH MG: at 09:01

## 2020-12-15 RX ADMIN — METHYLPREDNISOLONE SODIUM SUCCINATE SCH MG: 40 INJECTION, POWDER, FOR SOLUTION INTRAMUSCULAR; INTRAVENOUS at 21:11

## 2020-12-15 RX ADMIN — FAMOTIDINE SCH MG: 20 TABLET, FILM COATED ORAL at 21:10

## 2020-12-15 NOTE — PN
PROGRESS NOTE



DATE OF SERVICE:

12/15/2020



REASON FOR FOLLOWUP:

COVID-19 pneumonia.



INTERVAL HISTORY:

The patient is currently afebrile.  She is still requiring high-flow nasal cannula

oxygen.  Denies having any chest pain. Minimal cough. No abdominal pain. No diarrhea.



PHYSICAL EXAMINATION:

Blood pressure 133/76, pulse of 66, temperature 98.4. She is 87% on 15 L nonrebreather.

General description is a middle-aged female up in the bed in no distress.

RESPIRATORY SYSTEM:  Unlabored breathing, some coarse _____ wheeze at the right lung

base.

HEART: S1, S2.  Regular rate and rhythm.

ABDOMEN: Soft. No tenderness.



LABS:

Procalcitonin normal.  CRP is down to 4.8. D-dimer is slightly elevated.



DIAGNOSTIC IMPRESSION AND PLAN:

Patient with acute COVID-19 pneumonia completed her remdesivir therapy. Lovenox dose

has been up to 100 twice a day. Continue with Solu-Medrol, zinc and respiratory

support.  Monitor clinical course closely.





MMSHAINAL / IJN: [id number] / Job#: [id number]

## 2020-12-15 NOTE — PN
PROGRESS NOTE



PULMONARY/CRITICAL CARE PROGRESS NOTE:



DATE OF SERVICE:

December 15, 2020.



INTERVAL HISTORY:

This is a 65-year-old female admitted way back on December 5.  She has a history of

COVID-19 pneumonia/pneumonitis with severe hypoxemic respiratory failure.  Currently,

she is on AIRVO at 60 L/minute with FiO2 of 92%.  She also is wearing a partial

rebreather mask.  She is getting saline at 20 mL an hour.  She actually states that she

feels a bit better today.  She is still quite short of breath.  Any activity, she

desaturates.  She does have a cough.  It is mostly dry.  She denies any fever, chills.



PHYSICAL EXAMINATION:

Current vital signs are reviewed temperature is 98.  Heart rate 86, respiratory rate

19. Blood pressure 137/76, mean 96 and saturations are 89%.

GENERAL: Appears in no acute distress.  Mildly tachypneic and dyspneic.  No

conversational dyspnea.

HEENT: Examination is grossly unremarkable.  AIRVO cannula are noted as well a partial

rebreather mask.

NECK is supple full range of motion.  No adenopathy.  Neck veins are flat.

CARDIOVASCULAR: Examination reveals regular rhythm and rate.  Heart rate 86.  S1, S2

normal.

LUNGS:  Reveal a few scattered coarse rhonchi.  Some bibasilar crackles.  No wheezes.

Breath sounds equal.

ABDOMEN is obese.  Bowel sounds are heard.

EXTREMITIES are intact.  No cyanosis, clubbing, or significant edema.

SKIN: Without rash.

NEUROLOGIC: Examination is nonfocal.



LABS:

Reviewed.  From today, D-dimer 1.71, and C-reactive protein is 4.8.



Microbiologic studies including blood cultures are negative.



Chest x-ray from the 14th shows diffuse bilateral patchy infiltrates.



MEDICATIONS:

Reviewed.  Currently, the patient is on Tylenol, vitamin C, Wellbutrin, vitamin D3,

Lovenox, Pepcid, Neurontin, Motrin, Synthroid, Solu-Medrol, Narcan, Zofran, Afrin nasal

spray, and zinc.



ASSESSMENT:

1. Acute hypoxemic respiratory failure secondary to COVID-19 pneumonitis/pneumonia.

    The patient currently on AIRVO, 60 L/minute with a FiO2 of 92%, as well as a

    partial rebreather.

2. Elevated inflammatory markers secondary to COVID-19 infection.

3. Obesity.

4. Chronic back pain.



PLAN:

The patient did receive 5 days of Remdesivir.  She remains on vitamin C, vitamin D3,

and zinc.  She remains on corticosteroids.  Currently, the patient is on a partial

rebreather mask as well as AIRVO at 60 L/minute and FiO2 of 92%.  She is quite short of

breath with any activity.  She is hanging in there.  No additional recommendations are

made.  We will continue to follow.





MMODL / IJN: [id number] / Job#: [id number]

## 2020-12-15 NOTE — P.PN
Progress Note - Text


Progress Note Date: 12/15/20





Chief Complaint: Short of breath History of presenting complaint:


This is a 65-year-old patient of Dr. williamson.  Chronic stable medical 

conditions include hypothyroid, herniated disc.  She presented to the ER with 

complaints of weakness and shortness of breath.  Her entire family has the cold 

and a COVID test was negative.  Her pulse ox at home and was 78%.  And in the ER

it was 85%.  She is a nonsmoker.  Her primary care giver azithromycin, 

Hydrochlroquin and steroids.  She took these but the symptoms are not getting 

any better.  Symptoms have been going on for about 10 days.  She is also had 

some fever and chills.  Decreased appetite.  Decrease in taste and smell.  Had 

some headaches.  Has some diarrhea.  Also notices beige sputum.  Feels a chest 

tightness for deep breath.


Admitted with bilateral COVID pneumonia, possible gram-negative pneumonia, acute

hypoxic respiratory failure.  Started on IV Solu-Medrol Lovenox IV ceftriaxone. 

Remdesivir-added.  Patient received a dose of convalescent plasma.


Today-sitting at the edge of the bed.  Using high flow AIRVO.  Short of breath. 

Eating small amounts spaced out.





Review of systems: Was done for constitutional, cardiovascular, GI, pulmonary. 

relevant finding as above





Active Medications





Acetaminophen (Acetaminophen Tab 325 Mg Tab)  650 mg PO Q6HR PRN


   PRN Reason: Mild Pain or Fever > 100.5


   Last Admin: 12/15/20 09:01 Dose:  650 mg


   Documented by: 


Ascorbic Acid (Ascorbic Acid 500 Mg Tab)  500 mg PO DAILY [facility]


   Last Admin: 12/15/20 09:00 Dose:  500 mg


   Documented by: 


Bupropion HCl (Bupropion Xl 150 Mg Tab.Er.24h)  150 mg PO DAILY [facility]


   Last Admin: 12/15/20 09:01 Dose:  150 mg


   Documented by: 


Cholecalciferol (Cholecalciferol 1,000 Unit Tab)  1,000 unit PO DAILY [facility]


   Last Admin: 12/15/20 09:01 Dose:  1,000 unit


   Documented by: 


Enoxaparin Sodium (Enoxaparin 100 Mg/Ml Syringe)  100 mg SQ Q12HR [facility]


   Last Admin: 12/15/20 21:11 Dose:  100 mg


   Documented by: 


Famotidine (Famotidine 20 Mg Tab)  20 mg PO BID [facility]


   Last Admin: 12/15/20 21:10 Dose:  20 mg


   Documented by: 


Gabapentin (Gabapentin 300 Mg Cap)  300 mg PO BID [facility]


   Last Admin: 12/15/20 21:10 Dose:  300 mg


   Documented by: 


Ibuprofen (Ibuprofen 400 Mg Tab)  400 mg PO Q6HR PRN


   PRN Reason: Mild Pain or Fever > 100.5


   Last Admin: 12/15/20 21:13 Dose:  400 mg


   Documented by: 


Levothyroxine Sodium (Levothyroxine 100 Mcg Tab)  100 mcg PO DAILY@0630 [facility]


   Last Admin: 12/15/20 05:51 Dose:  100 mcg


   Documented by: 


Methylprednisolone Sodium Succinate (Methylprednisolone Sod Succi 40 Mg/Ml 1 Ml 

Vial)  40 mg IV Q8H [facility]


   Last Admin: 12/15/20 21:11 Dose:  40 mg


   Documented by: 


Naloxone HCl (Naloxone 0.4 Mg/Ml 1 Ml Vial)  0.2 mg IV Q2M PRN


   PRN Reason: Opioid Reversal


Ondansetron HCl (Ondansetron 4 Mg/2 Ml Vial)  4 mg IVP Q8HR PRN


   PRN Reason: Nausea And Vomiting


Oxymetazoline HCl (Oxymetazoline 0.05% Nasl Spray 1 Spray Bottle)  3 spray NASAL

BID PRN


   PRN Reason: CONGESTION


   Last Admin: 12/13/20 13:05 Dose:  3 spray


   Documented by: 


Zinc Sulfate (Zinc Sulfate 220 Mg Cap)  220 mg PO DAILY [facility]


   Last Admin: 12/15/20 09:01 Dose:  220 mg


   Documented by: 








Physical examination:


VITAL SIGNS: 98, 72, 20, 1:30 was 79, 87% on high flow 90% oxygen


GENERAL: Sitting of the edge of the bed, tripod, short of breath 


PSYCH: Alert and oriented x3;  mood  and affect tired 


NEUROLOGICAL: Cranial nerves grossly intact.  Moving  limbs


Rest of the exam as per nursing, pulmonary





INVESTIGATIONS, reviewed in the clinical context:


December 15: D-dimer 1.71 CRP 4.8 pro-calcitonin 0.03


December 14: D-dimer 1.21 CRP 7.9


December 13: D-dimer 1.17 CRP 16.7 potassium 4 creatinine 0.6


December 12: D-dimer 1.16 CRP 11.7


December 11: D-dimer 1.16 CRP 11.7


December 10: D-dimer 1.47 CRP 22.6


December 9: D-dimer 1.67 CRP 25.3


December 7: D-dimer 0.53 CRP 6.7 pro-calcitonin 0.02


White count 12.6 hemoglobin 15.1 and platelets 472 increased neutrophils


d-dimer 0.38 potassium 4.6 bun 20 creatinine 0.50


CRP 63


Coronavirus PCF-detected


EKG tracing personally reviewed by me-no sinus rhythm


Chest x-ray film personally reviewed by me-bilateral scattered infiltrates





Assessment:


-Bilateral COVID 19 pneumonia - slow to respond


-Possible gram-negative pneumonia, patient has beige sputum and left shift on 

CBC-on ceftriaxone initially then discontinued


-Acute hypoxic respiratory failure from pneumonia-slow to respond , remains on 

15 L nasal cannula


-Hypothyroid


-Morbid obesity BMI 41.6





Plan:


Continue IV Solu-Medrol.  , airvo 90%,, Lovenox.  Discussed with patient.  Use 

incentive spirometry.  Follow with pulmonary

## 2020-12-16 RX ADMIN — Medication SCH UNIT: at 08:36

## 2020-12-16 RX ADMIN — LEVOTHYROXINE SODIUM SCH MCG: 100 TABLET ORAL at 05:50

## 2020-12-16 RX ADMIN — ENOXAPARIN SODIUM SCH MG: 100 INJECTION SUBCUTANEOUS at 21:23

## 2020-12-16 RX ADMIN — GABAPENTIN SCH MG: 300 CAPSULE ORAL at 08:36

## 2020-12-16 RX ADMIN — ENOXAPARIN SODIUM SCH MG: 100 INJECTION SUBCUTANEOUS at 08:36

## 2020-12-16 RX ADMIN — ACETAMINOPHEN PRN MG: 325 TABLET, FILM COATED ORAL at 08:43

## 2020-12-16 RX ADMIN — METHYLPREDNISOLONE SODIUM SUCCINATE SCH MG: 125 INJECTION, POWDER, FOR SOLUTION INTRAMUSCULAR; INTRAVENOUS at 12:59

## 2020-12-16 RX ADMIN — METHYLPREDNISOLONE SODIUM SUCCINATE SCH: 40 INJECTION, POWDER, FOR SOLUTION INTRAMUSCULAR; INTRAVENOUS at 12:09

## 2020-12-16 RX ADMIN — METHYLPREDNISOLONE SODIUM SUCCINATE SCH MG: 40 INJECTION, POWDER, FOR SOLUTION INTRAMUSCULAR; INTRAVENOUS at 03:16

## 2020-12-16 RX ADMIN — OXYCODONE HYDROCHLORIDE AND ACETAMINOPHEN SCH MG: 500 TABLET ORAL at 08:36

## 2020-12-16 RX ADMIN — GABAPENTIN SCH MG: 300 CAPSULE ORAL at 21:23

## 2020-12-16 RX ADMIN — METHYLPREDNISOLONE SODIUM SUCCINATE SCH MG: 125 INJECTION, POWDER, FOR SOLUTION INTRAMUSCULAR; INTRAVENOUS at 17:26

## 2020-12-16 RX ADMIN — BUPROPION HYDROCHLORIDE SCH MG: 150 TABLET, FILM COATED, EXTENDED RELEASE ORAL at 08:37

## 2020-12-16 RX ADMIN — Medication SCH MG: at 08:37

## 2020-12-16 RX ADMIN — ACETAMINOPHEN PRN MG: 325 TABLET, FILM COATED ORAL at 21:24

## 2020-12-16 RX ADMIN — FAMOTIDINE SCH MG: 20 TABLET, FILM COATED ORAL at 21:23

## 2020-12-16 RX ADMIN — FAMOTIDINE SCH MG: 20 TABLET, FILM COATED ORAL at 08:36

## 2020-12-16 NOTE — PN
PROGRESS NOTE



PULMONARY/CRITICAL CARE PROGRESS NOTE:



DATE OF SERVICE:

12/16/2020



A 65-year-old female admitted way back on December 5 for an episode of acute COVID-19

pneumonia/pneumonitis with severe hypoxemic respiratory failure.  Apparently sometime

last night, she became more short of breath.  Previously, she was on AIRVO at 60

L/minute and FiO2 of 92%.  Currently, she is on BiPAP at 12/6 and 100%.  The patient is

also getting saline at 20 mL an hour.  She feels a bit better on the BiPAP than she did

on the AIRVO but still is struggling to breathe.  We did talk about the possibility of

mechanical ventilation.  She is hoping just to be able to get some rest.  In addition

to being short of breath, she admits to some cough and chest congestion.  She denies

any fever or chills.  There is no chest pain or chest discomfort.



Current vital signs are reviewed, her temperature is 98.4, heart rate 61, respiratory

rate 26, blood pressure 171/73 mean 105.  Her saturations are in the low 90s on the

BiPAP.  Appears quite tachypneic and dyspneic.  Currently BiPAP mask in place.

HEENT: Examination is grossly unremarkable.

NECK:  Supple, full range of motion.  No adenopathy.  Neck veins are flat.

CARDIOVASCULAR: Examination reveals regular rhythm and rate.  Heart rate 80 beats per

minute.  S1, S2 normal.  Heart sounds are distant.

LUNGS: Reveal diffuse bilateral coarse rhonchi.  There are some bibasilar crackles.  No

wheezes.  Breath sounds equal bilaterally but diminished throughout.

ABDOMEN:  Obese, bowel sounds are heard.

EXTREMITIES:  Intact.  No significant cyanosis, clubbing, or edema.

SKIN: Without rash.

NEUROLOGIC: Examination is brief but nonfocal.



LABS:

Reviewed.  Currently, her D-dimer is 1.71.  Her C-reactive protein is 4.8, and a

procalcitonin level is 0.03.



Microbiologic studies including blood cultures are all negative thus far.



Her most recent chest x-ray dated December 14, 2020 shows diffuse bilateral

infiltrates.



CURRENT MEDICATIONS:

Reviewed.  She is on Tylenol, ascorbic acid, Wellbutrin XL, vitamin D3, Lovenox,

famotidine, gabapentin, ibuprofen, levothyroxine, Solu-Medrol, Narcan, Zofran, Afrin

nasal spray, and zinc.



ASSESSMENT:

1. Acute hypoxemic respiratory failure secondary to COVID-19 pneumonitis/pneumonia,

    currently on BiPAP with settings of IPAP 12, EPAP 6, and 100%.  Yesterday, the

    patient was on AIRVO at 60 L/minute with a FiO2 of 92%.

2. Worsening shortness of breath, which may lead to sangeeta respiratory failure and need

    for intubation and mechanical ventilation.

3. Elevated inflammatory markers secondary to COVID-19 infection.

4. Obesity.

5. Chronic back pain.



PLAN:

We talked briefly about intubation and mechanical ventilation.  The patient was

noncommittal.  She is hoping to be able to get some rest on the BiPAP.  Her respiratory

status is certainly declined a bit.  She does feel a bit better on the BiPAP than she

did on AIRVO.  Will continue to follow.  She may need to be transferred to the ICU if

she continues to deteriorate.  A repeat chest x-ray will be done today.  Additional

recommendations and suggestions are forthcoming.  Prognosis is very guarded.





MMODL / IJN: [id number] / Job#: [id number]

## 2020-12-16 NOTE — P.PN
Subjective


Progress Note Date: 12/16/20








HISTORY OF PRESENT ILLNESS


This is a 65-year-old  female had onset of symptoms 10 days ago with 

fever, cough, shortness of breath.  She states for the first 6 or 7 day she was 

feeling okay but by day #8 she started feeling terrible.  She bought pulse ox 

reader and initially she was running 90% but then dropped down to 78%.  She was 

doing virtual visits with her physician's office and was put on dexamethasone, 

is azithromycin and completed course.  On recheck, patient was placed on 

hydroxychloroquine.  She denies having any abdominal pain.  No diarrhea.  She 

complains of a cough with chest pain with coughing, dark beige sputum 

production.  She complains of chest pain with deep inspiration.  She is feeling 

improvement since admission but continues to have cough and shortness of breath.

 Pulse ox is 90% on 15 L nasal cannula, heart rate 69, blood pressure 140/81.  

She has been afebrile.  W BC 12.6.  Lymphocytes normal.  Initial d-dimer 0.38 

and repeat 0.53.  Creatinine 0.5.  Ferritin level CCCLX.3.  AST 76, ALT 65, 

alkaline phosphatase 83.  LDH 1038.  C-reactive protein 63 with repeat of 6.7.  

COVID-19 positive.  Pro-calcitonin was 0.04 and repeat 0.02.  Chest x-ray 

reveals new bilateral interstitial pneumonia.  Patient has been started on 

ceftriaxone, dexamethasone 6 mg oral daily, Lovenox 40 mg subcu daily, 

supplements.





12/16: Patient completed course of Remdesivir and status post convalescent 

plasma.  Patient takes that she is feeling better.  She denies any chest pain 

but she does have pain with deep breathing.  She denies having any abdominal 

symptoms including abdominal pain, nausea vomiting or diarrhea.  Repeat chest x-

ray reveals bilateral reticulonodular infiltrates persist.  Patient is currently

pulse ox 91% on BiPAP.  Afebrile, heart rate 83, respiratory rate 28, blood 

pressure 153/78. blood pressure 154/75.  








PHYSICAL EXAMINATION


Gen: This is a morbidly obese 65-year-old  female.  Patient is on 

BiPAP.


HEENT: Head is atraumatic, normocephalic. Pupils equal, round. Sclerae is 

anicteric. 


NECK: Supple. No JVD. 


LUNGS: Diminished in the bilateral bases.  Mild expiratory wheeze more so on the

right side.  No intercostal retractions.


HEART: Regular rate and rhythm. No murmur. 


ABDOMEN: Soft. Bowel sounds are present. No masses.  No tenderness.


EXTREMITIES: No pedal edema.  No calf tenderness.


NEUROLOGICAL: Patient is awake, alert and oriented x3. 








ASSESSMENT


Covid 19 pneumonia


Acute hypoxic respiratory failure


Elevated inflammatory markers








PLAN


Completed course of Remdesivir


Status post convalescent plasma


Continue Solu-Medrol 60 mg IV every 6 hours, Lovenox 100 mg subcu twice daily, 

supplements


Continue oxygen supplementation and wean as appropriate








The above dictated assessment and findings were discussed with Dr. Garcia.  The 

impression and plan of care have been directed as dictated.  Mary Carmen Carter nurse 

practitioner acting as scribe for Dr. Garcia.











Objective





- Vital Signs


Vital signs: 


                                   Vital Signs











Temp  98.4 F   12/15/20 21:23


 


Pulse  61   12/16/20 05:00


 


Resp  26 H  12/16/20 05:00


 


BP  171/73   12/16/20 05:00


 


Pulse Ox  93 L  12/16/20 05:00








                                 Intake & Output











 12/15/20 12/16/20 12/16/20





 18:59 06:59 18:59


 


Intake Total  500 


 


Balance  500 


 


Intake:   


 


  Oral  500 


 


Other:   


 


  Voiding Method Bedside Commode Bedside Commode Bedside Commode


 


  # Voids 2 0 


 


  # Bowel Movements  0 














- Labs


CBC & Chem 7: 


                                 12/13/20 06:40





                                 12/13/20 06:45


Labs: 


                  Abnormal Lab Results - Last 24 Hours (Table)











  12/15/20 Range/Units





  06:26 


 


C-Reactive Protein  4.8 H  (0.0-0.8)  mg/dL

## 2020-12-16 NOTE — XR
EXAMINATION TYPE: XR chest 1V portable

 

DATE OF EXAM: 12/16/2020

 

HISTORY: Shortness of breath.

 

COMPARISON: 12/14/2020

 

TECHNIQUE: Single view of the chest is submitted.

 

FINDINGS:

Demonstrated are scattered senescent parenchymal change.  

 

Bilateral reticulonodular infiltrates persist compatible Covid 19 pneumonia.

 

The heart is stable.

 

Hilar and mediastinal structures are within normal limits.  

 

Degenerative changes are seen of the dorsal spine. 

 

 IMPRESSION: 

 

1.  Bilateral reticulonodular infiltrates persist compatible Covid 19 pneumonia.

## 2020-12-16 NOTE — P.PN
Progress Note - Text


Progress Note Date: 12/16/20





Chief Complaint: Short of breath History of presenting complaint:


This is a 65-year-old patient of Dr. williamson.  Chronic stable medical 

conditions include hypothyroid, herniated disc.  She presented to the ER with 

complaints of weakness and shortness of breath.  Her entire family has the cold 

and a COVID test was negative.  Her pulse ox at home and was 78%.  And in the ER

it was 85%.  She is a nonsmoker.  Her primary care giver azithromycin, 

Hydrochlroquin and steroids.  She took these but the symptoms are not getting 

any better.  Symptoms have been going on for about 10 days.  She is also had 

some fever and chills.  Decreased appetite.  Decrease in taste and smell.  Had 

some headaches.  Has some diarrhea.  Also notices beige sputum.  Feels a chest 

tightness for deep breath.


Admitted with bilateral COVID pneumonia, possible gram-negative pneumonia, acute

hypoxic respiratory failure.  Started on IV Solu-Medrol Lovenox IV ceftriaxone. 

Remdesivir-added.  Patient received a dose of convalescent plasma.


Today-put on a BiPAP today..   Some oral intake.  On his recliner





Review of systems: Was done for constitutional, cardiovascular, GI, pulmonary. 

relevant finding as above





Active Medications





Acetaminophen (Acetaminophen Tab 325 Mg Tab)  650 mg PO Q6HR PRN


   PRN Reason: Mild Pain or Fever > 100.5


   Last Admin: 12/16/20 21:24 Dose:  650 mg


   Documented by: 


Ascorbic Acid (Ascorbic Acid 500 Mg Tab)  500 mg PO DAILY [facility]


   Last Admin: 12/16/20 08:36 Dose:  500 mg


   Documented by: 


Bupropion HCl (Bupropion Xl 150 Mg Tab.Er.24h)  150 mg PO DAILY [facility]


   Last Admin: 12/16/20 08:37 Dose:  150 mg


   Documented by: 


Cholecalciferol (Cholecalciferol 1,000 Unit Tab)  1,000 unit PO DAILY [facility]


   Last Admin: 12/16/20 08:36 Dose:  1,000 unit


   Documented by: 


Enoxaparin Sodium (Enoxaparin 100 Mg/Ml Syringe)  100 mg SQ Q12HR [facility]


   Last Admin: 12/16/20 21:23 Dose:  100 mg


   Documented by: 


Famotidine (Famotidine 20 Mg Tab)  20 mg PO BID [facility]


   Last Admin: 12/16/20 21:23 Dose:  20 mg


   Documented by: 


Gabapentin (Gabapentin 300 Mg Cap)  300 mg PO BID [facility]


   Last Admin: 12/16/20 21:23 Dose:  300 mg


   Documented by: 


Ibuprofen (Ibuprofen 400 Mg Tab)  400 mg PO Q6HR PRN


   PRN Reason: Mild Pain or Fever > 100.5


   Last Admin: 12/15/20 21:13 Dose:  400 mg


   Documented by: 


Levothyroxine Sodium (Levothyroxine 100 Mcg Tab)  100 mcg PO DAILY@0630 [facility]


   Last Admin: 12/16/20 05:50 Dose:  100 mcg


   Documented by: 


Methylprednisolone Sodium Succinate (Methylprednisolone Sod Succi 125 Mg/2 Ml 

Vial)  60 mg IV Q6HR [facility]


   Last Admin: 12/16/20 17:26 Dose:  60 mg


   Documented by: 


Naloxone HCl (Naloxone 0.4 Mg/Ml 1 Ml Vial)  0.2 mg IV Q2M PRN


   PRN Reason: Opioid Reversal


Ondansetron HCl (Ondansetron 4 Mg/2 Ml Vial)  4 mg IVP Q8HR PRN


   PRN Reason: Nausea And Vomiting


Oxymetazoline HCl (Oxymetazoline 0.05% Nasl Spray 1 Spray Bottle)  3 spray NASAL

BID PRN


   PRN Reason: CONGESTION


   Last Admin: 12/13/20 13:05 Dose:  3 spray


   Documented by: 


Zinc Sulfate (Zinc Sulfate 220 Mg Cap)  220 mg PO DAILY [facility]


   Last Admin: 12/16/20 08:37 Dose:  220 mg


   Documented by: 














Physical examination:


VITAL SIGNS: 98, 83, 28, 153 with 78, 91% on BiPAP


GENERAL: Sitting in a recliner, short of breath


PSYCH: Alert and oriented x3;  mood  and affect tired 


NEUROLOGICAL: Cranial nerves grossly intact.  Moving  limbs


Rest of the exam as per nursing, pulmonary





INVESTIGATIONS, reviewed in the clinical context:


December 16: D-dimer 1.18, CRP 59.5


December 15: D-dimer 1.71 CRP 4.8 pro-calcitonin 0.03


December 14: D-dimer 1.21 CRP 7.9


December 13: D-dimer 1.17 CRP 16.7 potassium 4 creatinine 0.6


December 12: D-dimer 1.16 CRP 11.7


December 11: D-dimer 1.16 CRP 11.7


December 10: D-dimer 1.47 CRP 22.6


December 9: D-dimer 1.67 CRP 25.3


December 7: D-dimer 0.53 CRP 6.7 pro-calcitonin 0.02


White count 12.6 hemoglobin 15.1 and platelets 472 increased neutrophils


d-dimer 0.38 potassium 4.6 bun 20 creatinine 0.50


CRP 63


Coronavirus PCF-detected


EKG tracing personally reviewed by me-no sinus rhythm


Chest x-ray film personally reviewed by me-bilateral scattered infiltrates





Assessment:


-Bilateral COVID 19 pneumonia - slow to respond


-Possible gram-negative pneumonia, patient has beige sputum and left shift on 

CBC-on ceftriaxone initially then discontinued


-Acute hypoxic respiratory failure from worsening, changed to 2 BiPAP today.


-Hypothyroid


-Morbid obesity BMI 41.6





Plan:


On BiPAP.  IV Solu-Medrol increased., Lovenox.  On BiPAP.  Follow with 

pulmonary.

## 2020-12-17 RX ADMIN — ACETAMINOPHEN PRN MG: 325 TABLET, FILM COATED ORAL at 19:45

## 2020-12-17 RX ADMIN — METHYLPREDNISOLONE SODIUM SUCCINATE SCH MG: 125 INJECTION, POWDER, FOR SOLUTION INTRAMUSCULAR; INTRAVENOUS at 17:27

## 2020-12-17 RX ADMIN — Medication SCH UNIT: at 07:49

## 2020-12-17 RX ADMIN — GABAPENTIN SCH MG: 300 CAPSULE ORAL at 21:51

## 2020-12-17 RX ADMIN — METHYLPREDNISOLONE SODIUM SUCCINATE SCH MG: 125 INJECTION, POWDER, FOR SOLUTION INTRAMUSCULAR; INTRAVENOUS at 00:06

## 2020-12-17 RX ADMIN — BUPROPION HYDROCHLORIDE SCH MG: 150 TABLET, FILM COATED, EXTENDED RELEASE ORAL at 07:49

## 2020-12-17 RX ADMIN — FAMOTIDINE SCH MG: 20 TABLET, FILM COATED ORAL at 07:49

## 2020-12-17 RX ADMIN — METHYLPREDNISOLONE SODIUM SUCCINATE SCH MG: 125 INJECTION, POWDER, FOR SOLUTION INTRAMUSCULAR; INTRAVENOUS at 05:24

## 2020-12-17 RX ADMIN — GABAPENTIN SCH MG: 300 CAPSULE ORAL at 07:49

## 2020-12-17 RX ADMIN — METHYLPREDNISOLONE SODIUM SUCCINATE SCH MG: 125 INJECTION, POWDER, FOR SOLUTION INTRAMUSCULAR; INTRAVENOUS at 13:43

## 2020-12-17 RX ADMIN — FAMOTIDINE SCH MG: 20 TABLET, FILM COATED ORAL at 21:51

## 2020-12-17 RX ADMIN — Medication SCH MG: at 07:49

## 2020-12-17 RX ADMIN — ENOXAPARIN SODIUM SCH MG: 100 INJECTION SUBCUTANEOUS at 07:49

## 2020-12-17 RX ADMIN — OXYCODONE HYDROCHLORIDE AND ACETAMINOPHEN SCH MG: 500 TABLET ORAL at 07:49

## 2020-12-17 RX ADMIN — LEVOTHYROXINE SODIUM SCH MCG: 100 TABLET ORAL at 05:25

## 2020-12-17 NOTE — P.PN
Subjective


Progress Note Date: 12/17/20








HISTORY OF PRESENT ILLNESS


This is a 65-year-old  female had onset of symptoms 10 days ago with 

fever, cough, shortness of breath.  She states for the first 6 or 7 day she was 

feeling okay but by day #8 she started feeling terrible.  She bought pulse ox 

reader and initially she was running 90% but then dropped down to 78%.  She was 

doing virtual visits with her physician's office and was put on dexamethasone, 

is azithromycin and completed course.  On recheck, patient was placed on 

hydroxychloroquine.  She denies having any abdominal pain.  No diarrhea.  She 

complains of a cough with chest pain with coughing, dark beige sputum 

production.  She complains of chest pain with deep inspiration.  She is feeling 

improvement since admission but continues to have cough and shortness of breath.

 Pulse ox is 90% on 15 L nasal cannula, heart rate 69, blood pressure 140/81.  

She has been afebrile.  W BC 12.6.  Lymphocytes normal.  Initial d-dimer 0.38 

and repeat 0.53.  Creatinine 0.5.  Ferritin level CCCLX.3.  AST 76, ALT 65, 

alkaline phosphatase 83.  LDH 1038.  C-reactive protein 63 with repeat of 6.7.  

COVID-19 positive.  Pro-calcitonin was 0.04 and repeat 0.02.  Chest x-ray 

reveals new bilateral interstitial pneumonia.  Patient has been started on 

ceftriaxone, dexamethasone 6 mg oral daily, Lovenox 40 mg subcu daily, 

supplements.





12/16: Patient completed course of Remdesivir and status post convalescent 

plasma.  Patient takes that she is feeling better.  She denies any chest pain 

but she does have pain with deep breathing.  She denies having any abdominal 

symptoms including abdominal pain, nausea vomiting or diarrhea.  Repeat chest x-

ray reveals bilateral reticulonodular infiltrates persist.  Patient is currently

pulse ox 91% on BiPAP.  Afebrile, heart rate 83, respiratory rate 28, blood 

pressure 153/78. blood pressure 154/75.  





12/17: Patient states that she is feeling better today.  She feels that her 

lungs are opening up and she is able to take a deep breath.  She continues to 

cough.  She denies any chest pain.  She denies abdominal pain, nausea or 

vomiting.  Pulse ox is 89-91% on high flow nasal cannula FiO2 of 80. 





PHYSICAL EXAMINATION


Gen: This is a morbidly obese 65-year-old  female.  Patient is on 

BiPAP.


HEENT: Head is atraumatic, normocephalic. Pupils equal, round. Sclerae is 

anicteric. 


NECK: Supple. No JVD. 


LUNGS: Crackles to the bilateral bases.  No intercostal retractions.


HEART: Regular rate and rhythm. No murmur. 


ABDOMEN: Soft. Bowel sounds are present. No masses.  No tenderness.


EXTREMITIES: No pedal edema.  No calf tenderness.


NEUROLOGICAL: Patient is awake, alert and oriented x3. 








ASSESSMENT


Covid 19 pneumonia


Acute hypoxic respiratory failure


Elevated inflammatory markers








PLAN


Completed course of Remdesivir


Status post convalescent plasma


Continue Solu-Medrol 60 mg IV every 6 hours, Lovenox 100 mg subcu twice daily, s

upplements


Continue oxygen supplementation and wean as appropriate








The above dictated assessment and findings were discussed with Dr. Garcia.  The 

impression and plan of care have been directed as dictated.  Mary Carmen Carter nurse 

practitioner acting as scribe for Dr. Garcia.











Objective





- Vital Signs


Vital signs: 


                                   Vital Signs











Temp  98.1 F   12/17/20 05:00


 


Pulse  76   12/17/20 05:00


 


Resp  16   12/17/20 05:00


 


BP  130/82   12/17/20 05:00


 


Pulse Ox  89 L  12/17/20 07:53








                                 Intake & Output











 12/16/20 12/17/20 12/17/20





 18:59 06:59 18:59


 


Intake Total 240 110 240


 


Balance 240 110 240


 


Intake:   


 


   110 


 


    .9  110 


 


  Oral   240


 


Other:   


 


  Voiding Method Bedside Commode Bedside Commode 


 


  # Voids  0 


 


  # Bowel Movements  0 














- Labs


CBC & Chem 7: 


                                 12/13/20 06:40





                                 12/13/20 06:45


Labs: 


                  Abnormal Lab Results - Last 24 Hours (Table)











  12/16/20 12/16/20 12/17/20 Range/Units





  12:45 12:45 05:20 


 


D-Dimer  1.18 H   1.04 H  (<0.60)  mg/L FEU


 


C-Reactive Protein   59.5 H   (<10.0)  mg/L














  12/17/20 Range/Units





  05:20 


 


D-Dimer   (<0.60)  mg/L FEU


 


C-Reactive Protein  4.4 H  (<10.0)  mg/L

## 2020-12-17 NOTE — P.PN
Subjective


Progress Note Date: 12/17/20


Principal diagnosis: 





Acute bilateral pneumonia secondary to Covid 19 infection 





65-year-old male patient and the patient came into the emergency department 

yesterday because of generalized weakness shortness of breath.  The patient had 

a pulse ox of 70%.  The patient is a nonsmoker.  Apparently symptoms of been 

progressively getting worse over the past 10 days. [facility] and her symptoms were waxing and waning and progressively 

she got worse and she ultimately had come to the hospital because of worsening 

shortness of breath. He had some fever and chills.  He had diminished appetite. 

He had decreased taste and smell.  The patient tested positive for COVID 19 by 

PCR and the patient is currently being treated for this pneumonia.  The patient 

was admitted because of 12.6.  The patient has a platelet count of 472.  

Preservation.  Positive normal.  D-dimer is not elevated at 0.53.  The ferritin 

level is at 360, the patient has a mild transaminitis with an AST of 76, ALP of 

65 with an LDH of 1038.  CRP is at 63 and appropriate LEVEL IS AT 0.04.  REST X-

RAY SHOWING NEW BILATERAL INTERSTITIAL INFILTRATION PERIHILAR.  THE PATIENT IS 

CURRENTLY ON OXYGEN AND THE PATIENT IS CURRENTLY ON 15 L TO MAINTAIN A 

SATURATION ABOVE 90%.  She is afebrile. 








on today's evaluation of 12/08/2020, the patient is a bit struggling with her 

breathing.  She was able to sit up on a chair or morning.  Noted the patient was

started on a combination of oxygen delivery systems including high flow oxygen 

at 15 L nasal cannula and 100% nonrebreather facemask to maintain a saturation 

between 88-92%.  She has some limited cough.  No significant sputum production. 

She is laying comfortably in bed.  she is having some mild degree of shortness 

of breath even at rest.  She is not using her muscles of breathing.   Note that 

the patient was started on a combination of Decadron and Remdesivir I'm also 

inclining giving her a unit of convalescence as her condition. no nausea.  No 

vomiting.  No diarrhea.  No abdominal pain.  No altered mentation.





on 12/09/2020 16 the patient for a follow-up.  The patient is currently on a 15 

L high flow oxygen.  On and off she is also using the 100% nonrebreather 

facemask.  She is doing well.  No specific complaints.  She is getting less 

short of breath.  Her lungs are less achy on today's evaluation.  No nausea.  No

vomiting.  She is on day 2 of Remdesivir she is also on Decadron.  She receives 

convalescent plasma.  No nausea.  No vomiting.  No diarrhea.





On today's evaluation of 12/10/2020, the patient feels that she is doing 

slightly better.  She still on 100% nonrebreather facemask and 15 L of oxygen by

nasal cannula.  She is afebrile.  She is taken Remdesivir day #3 and Decadron.  

Her inflammatory markers show a d-dimer of 1.47, ferritin level is up to 1059, 

her LDH level is down to 509 and her CRP level is down to 22.6.  No nausea.  No 

vomiting.  No diarrhea.  She was able to sit up on a recliner all day.  

Currently she is back in bed.  No other new complaints otherwise for now.  No 

improvement in her oxygenation. Her chest x-ray from yesterday showed bilateral 

airspace disease and some more confluent density in the upper lobes.





The patient is seen today 12/11/2020 in follow-up on the regular medical floor. 

She is currently resting fairly comfortably in bed.  She's been up in the chair 

most of the day.  She is still requiring 15 L high flow nasal cannula along with

a nonrebreather to maintain O2 saturations in the 90s.  She's been afebrile.  

Hemodynamically stable.  D-dimer 1.16.  Reactive protein 11.7.  She remains on 

Lovenox 100 mg subcu every 12 hours.  Currently on IV Solu-Medrol.  Receiving 

day #4 of Remdesivir.  She was given convalescent plasma as well.  Chest x-ray 

continues to reveal stable diffuse increased lung markings bilaterally 

compatible with atypical pneumonia.





The patient is seen today December 17 2020 in follow-up on the regular medical 

floor.  She is currently sitting up at the bedside.  Awake and alert in no acute

distress.  She is breathing a bit better today compared to yesterday.  She is 

still requiring 60 L and 90% of AirVo high flow oxygen to maintain O2 saturation

in the 90s.  D-dimer 1.04.  C-reactive protein 4.4.  She is complete course of R

emdesivir.  She is received convalescent plasma.  Currently on IV Solu-Medrol.  

Lovenox.  Vitamin supplements.





Objective





- Vital Signs


Vital signs: 


                                   Vital Signs











Temp  98 F   12/17/20 11:00


 


Pulse  90   12/17/20 11:00


 


Resp  24   12/17/20 11:00


 


BP  139/83   12/17/20 11:00


 


Pulse Ox  89 L  12/17/20 07:53








                                 Intake & Output











 12/16/20 12/17/20 12/17/20





 18:59 06:59 18:59


 


Intake Total 240 110 480


 


Balance 240 110 480


 


Intake:   


 


   110 


 


    .9  110 


 


  Oral   480


 


Other:   


 


  Voiding Method Bedside Commode Bedside Commode 


 


  # Voids  0 


 


  # Bowel Movements  0 














- Exam





Gen. appearance.  Pleasant 65-year-old female patient, is calm comfortable, mild

degree of respiratory distress currently on AirVo high flow at 60 L and 90% FiO2

in addition to 100% nonrebreather facemask.


Head exam was generally normal. There was no scleral icterus or corneal arcus. 

Mucous membranes were moist.


Neck was supple and without jugular venous distension, thyromegaly, or carotid 

bruits. Carotids were easily palpable bilaterally. There was no adenopathy.


Lungs sounds are diminished with crackles in the bilateral lung bases and a few 

scattered rhonchi.


Cardiac exam revealed the PMI to be normally situated and sized. The rhythm was 

regular and no extrasystoles were noted during several minutes of auscultation. 

The first and second heart sounds were normal and physiologic splitting of the 

second heart sound was noted. There were no murmurs, rubs, clicks, or gallops.


Abdominal exam revealed normal bowel sounds. The abdomen was soft, non-tender, 

and without masses, organomegaly, or appreciable enlargement of the abdominal 

aorta.


Examination of the extremities revealed easily palpable radial, femoral and 

pedal pulses. There was no cyanosis, clubbing or edema.


Examination of the skin revealed no evidence of significant rashes, suspicious 

appearing nevi or other concerning lesions.





- Labs


CBC & Chem 7: 


                                 12/13/20 06:40





                                 12/13/20 06:45


Labs: 


                  Abnormal Lab Results - Last 24 Hours (Table)











  12/17/20 12/17/20 Range/Units





  05:20 05:20 


 


D-Dimer  1.04 H   (<0.60)  mg/L FEU


 


C-Reactive Protein   4.4 H  (0.0-0.8)  mg/dL














Assessment and Plan


Assessment: 





1 acute bilateral pneumonia secondary to Covid 19 infection .  Completed 

Remdesivir.  Received convalescent plasma.  On Lovenox, IV Solu-Medrol.  The 

patient's oxygenation is unchanged the patient continues to be on same amount of

oxygen which is 100% nonrebreather with AirVo high flow oxygen at 60 L and 90% 

FiO2.





2 acute hypoxic respiratory failure currently on AirVo high flow nasal cannula, 

in addition to 100% nonrebreather facemask.





3 elevated inflammatory markers, mild





4 obesity





5 chronic back pain





Plan





The patient was seen and evaluated by Dr. Logan


Completed Remdesivir


Received convalescent plasma


Remains on IV Solu-Medrol, vitamin supplements


D-dimer 1.04, decrease Lovenox to 40 daily


She has been slow to progress


Titrate the FiO2 as tolerated


We will continue to follow and make further recommendations based on her 

clinical status





I, the cosigning physician, performed a history & physical examination of the 

patient. Lungs sounds with by basilar crackles, scattered rhonchi.  Maintaining 

good O2 saturations in the 90s on AirVo high flow with nonrebreather mask.  I 

discussed the assessment and plan of care with my nurse practitioner, Ariella Epstein. I attest to the above note as dictated by her.

## 2020-12-18 RX ADMIN — METHYLPREDNISOLONE SODIUM SUCCINATE SCH MG: 125 INJECTION, POWDER, FOR SOLUTION INTRAMUSCULAR; INTRAVENOUS at 05:50

## 2020-12-18 RX ADMIN — LEVOTHYROXINE SODIUM SCH MCG: 100 TABLET ORAL at 05:50

## 2020-12-18 RX ADMIN — FAMOTIDINE SCH MG: 20 TABLET, FILM COATED ORAL at 21:01

## 2020-12-18 RX ADMIN — ENOXAPARIN SODIUM SCH MG: 40 INJECTION SUBCUTANEOUS at 07:29

## 2020-12-18 RX ADMIN — ACETAMINOPHEN PRN MG: 325 TABLET, FILM COATED ORAL at 15:28

## 2020-12-18 RX ADMIN — METHYLPREDNISOLONE SODIUM SUCCINATE SCH MG: 125 INJECTION, POWDER, FOR SOLUTION INTRAMUSCULAR; INTRAVENOUS at 00:03

## 2020-12-18 RX ADMIN — IBUPROFEN PRN MG: 400 TABLET, FILM COATED ORAL at 04:50

## 2020-12-18 RX ADMIN — METHYLPREDNISOLONE SODIUM SUCCINATE SCH MG: 125 INJECTION, POWDER, FOR SOLUTION INTRAMUSCULAR; INTRAVENOUS at 23:59

## 2020-12-18 RX ADMIN — METHYLPREDNISOLONE SODIUM SUCCINATE SCH MG: 125 INJECTION, POWDER, FOR SOLUTION INTRAMUSCULAR; INTRAVENOUS at 13:02

## 2020-12-18 RX ADMIN — GABAPENTIN SCH MG: 300 CAPSULE ORAL at 21:00

## 2020-12-18 RX ADMIN — METHYLPREDNISOLONE SODIUM SUCCINATE SCH MG: 125 INJECTION, POWDER, FOR SOLUTION INTRAMUSCULAR; INTRAVENOUS at 17:46

## 2020-12-18 RX ADMIN — FAMOTIDINE SCH MG: 20 TABLET, FILM COATED ORAL at 07:30

## 2020-12-18 RX ADMIN — GABAPENTIN SCH MG: 300 CAPSULE ORAL at 07:30

## 2020-12-18 RX ADMIN — OXYCODONE HYDROCHLORIDE AND ACETAMINOPHEN SCH MG: 500 TABLET ORAL at 07:29

## 2020-12-18 RX ADMIN — BUPROPION HYDROCHLORIDE SCH MG: 150 TABLET, FILM COATED, EXTENDED RELEASE ORAL at 07:30

## 2020-12-18 RX ADMIN — Medication SCH MG: at 07:30

## 2020-12-18 RX ADMIN — Medication SCH UNIT: at 07:30

## 2020-12-18 NOTE — XR
EXAMINATION TYPE: XR chest 1V portable

 

DATE OF EXAM: 12/18/2020

 

COMPARISON: 12/16/2020

 

INDICATION: Covid

 

TECHNIQUE: Single frontal view of the chest is obtained.

 

FINDINGS:  

The heart size is normal.  

The pulmonary vasculature is normal.  

Diffuse increased lung markings are present. Air bronchograms are present. There may be slight improv
ement over the interval.  

 

 

IMPRESSION:  

1. Improvement of diffuse bilateral lung infiltrates. Findings can be compatible with atypical pneumo
abel.

## 2020-12-18 NOTE — P.PN
Progress Note - Text


Progress Note Date: 12/17/20





Chief Complaint: Short of breath History of presenting complaint:


This is a 65-year-old patient of Dr. williamson.  Chronic stable medical 

conditions include hypothyroid, herniated disc.  She presented to the ER with 

complaints of weakness and shortness of breath.  Her entire family has the cold 

and a COVID test was negative.  Her pulse ox at home and was 78%.  And in the ER

it was 85%.  She is a nonsmoker.  Her primary care giver azithromycin, 

Hydrochlroquin and steroids.  She took these but the symptoms are not getting 

any better.  Symptoms have been going on for about 10 days.  She is also had 

some fever and chills.  Decreased appetite.  Decrease in taste and smell.  Had 

some headaches.  Has some diarrhea.  Also notices beige sputum.  Feels a chest 

tightness for deep breath.


Admitted with bilateral COVID pneumonia, possible gram-negative pneumonia, acute

hypoxic respiratory failure.  Started on IV Solu-Medrol Lovenox IV ceftriaxone. 

Remdesivir-added.  Patient received a dose of convalescent plasma.


Today-using BiPAP.  Feels a bit better.  Started to eat better.  Had a bowel 

movement.





Review of systems: Was done for constitutional, cardiovascular, GI, pulmonary. 

relevant finding as above





Active Medications





Acetaminophen (Acetaminophen Tab 325 Mg Tab)  650 mg PO Q6HR PRN


   PRN Reason: Mild Pain or Fever > 100.5


   Last Admin: 12/18/20 15:28 Dose:  650 mg


   Documented by: 


Ascorbic Acid (Ascorbic Acid 500 Mg Tab)  500 mg PO DAILY [facility]


   Last Admin: 12/18/20 07:29 Dose:  500 mg


   Documented by: 


Bupropion HCl (Bupropion Xl 150 Mg Tab.Er.24h)  150 mg PO DAILY [facility]


   Last Admin: 12/18/20 07:30 Dose:  150 mg


   Documented by: 


Cholecalciferol (Cholecalciferol 1,000 Unit Tab)  1,000 unit PO DAILY [facility]


   Last Admin: 12/18/20 07:30 Dose:  1,000 unit


   Documented by: 


Enoxaparin Sodium (Enoxaparin 40 Mg/0.4 Ml Syringe)  40 mg SQ DAILY [facility]


   Last Admin: 12/18/20 07:29 Dose:  40 mg


   Documented by: 


Famotidine (Famotidine 20 Mg Tab)  20 mg PO BID [facility]


   Last Admin: 12/18/20 21:01 Dose:  20 mg


   Documented by: 


Gabapentin (Gabapentin 300 Mg Cap)  300 mg PO BID [facility]


   Last Admin: 12/18/20 21:00 Dose:  300 mg


   Documented by: 


Ibuprofen (Ibuprofen 400 Mg Tab)  400 mg PO Q6HR PRN


   PRN Reason: Mild Pain or Fever > 100.5


   Last Admin: 12/18/20 04:50 Dose:  400 mg


   Documented by: 


Levothyroxine Sodium (Levothyroxine 100 Mcg Tab)  100 mcg PO DAILY@0630 [facility]


   Last Admin: 12/18/20 05:50 Dose:  100 mcg


   Documented by: 


Methylprednisolone Sodium Succinate (Methylprednisolone Sod Succi 125 Mg/2 Ml 

Vial)  60 mg IV Q6HR [facility]


   Last Admin: 12/18/20 17:46 Dose:  60 mg


   Documented by: 


Naloxone HCl (Naloxone 0.4 Mg/Ml 1 Ml Vial)  0.2 mg IV Q2M PRN


   PRN Reason: Opioid Reversal


Ondansetron HCl (Ondansetron 4 Mg/2 Ml Vial)  4 mg IVP Q8HR PRN


   PRN Reason: Nausea And Vomiting


Oxymetazoline HCl (Oxymetazoline 0.05% Nasl Spray 1 Spray Bottle)  3 spray NASAL

BID PRN


   PRN Reason: CONGESTION


   Last Admin: 12/13/20 13:05 Dose:  3 spray


   Documented by: 


Zinc Sulfate (Zinc Sulfate 220 Mg Cap)  220 mg PO DAILY [facility]


   Last Admin: 12/18/20 07:30 Dose:  220 mg


   Documented by: 














Physical examination:


VITAL SIGNS: 98.1, 78, 18, 1 56 x 80, 90% on 90% FiO2 with a 60 L flow rate


GENERAL: Sitting of the edge of the bed, short of breath


PSYCH: Alert and oriented x3;  mood  and affect tired 


NEUROLOGICAL: Cranial nerves grossly intact.  Moving  limbs


Rest of the exam as per nursing, pulmonary





INVESTIGATIONS, reviewed in the clinical context:


December 17: D-dimer 1.04 CRP 4.4


December 16: D-dimer 1.18, CRP 59.5


December 15: D-dimer 1.71 CRP 4.8 pro-calcitonin 0.03


December 14: D-dimer 1.21 CRP 7.9


December 13: D-dimer 1.17 CRP 16.7 potassium 4 creatinine 0.6


December 12: D-dimer 1.16 CRP 11.7


December 11: D-dimer 1.16 CRP 11.7


December 10: D-dimer 1.47 CRP 22.6


December 9: D-dimer 1.67 CRP 25.3


December 7: D-dimer 0.53 CRP 6.7 pro-calcitonin 0.02


White count 12.6 hemoglobin 15.1 and platelets 472 increased neutrophils


d-dimer 0.38 potassium 4.6 bun 20 creatinine 0.50


CRP 63


Coronavirus PCF-detected


EKG tracing personally reviewed by me-no sinus rhythm


Chest x-ray film personally reviewed by me-bilateral scattered infiltrates





Assessment:


-Bilateral COVID 19 pneumonia - slow to respond


-Possible gram-negative pneumonia, patient has beige sputum and left shift on 

CBC-on ceftriaxone initially then discontinued


-Acute hypoxic respiratory failure from worsening, continue on BiPAP.


-Hypothyroid


-Morbid obesity BMI 41.6





Plan:


On BiPAP.  IV Solu-Medrol  Lovenox.  Other medications to continue.  Discussed 

with patient.  Follow with pulmonary

## 2020-12-18 NOTE — P.PN
Progress Note - Text


Progress Note Date: 12/18/20





Chief Complaint: Short of breath History of presenting complaint:


This is a 65-year-old patient of Dr. williamson.  Chronic stable medical 

conditions include hypothyroid, herniated disc.  She presented to the ER with 

complaints of weakness and shortness of breath.  Her entire family has the cold 

and a COVID test was negative.  Her pulse ox at home and was 78%.  And in the ER

it was 85%.  She is a nonsmoker.  Her primary care giver azithromycin, 

Hydrochlroquin and steroids.  She took these but the symptoms are not getting 

any better.  Symptoms have been going on for about 10 days.  She is also had 

some fever and chills.  Decreased appetite.  Decrease in taste and smell.  Had 

some headaches.  Has some diarrhea.  Also notices beige sputum.  Feels a chest 

tightness for deep breath.


Admitted with bilateral COVID pneumonia, possible gram-negative pneumonia, acute

hypoxic respiratory failure.  Started on IV Solu-Medrol Lovenox IV ceftriaxone. 

Remdesivir-added.  Patient received a dose of convalescent plasma.


Today-using BiPAP.  Some improvement.  Oral intake better.  Less tired.  Sitting

of days the bed.





Review of systems: Was done for constitutional, cardiovascular, GI, pulmonary. 

relevant finding as above





Active Medications





Acetaminophen (Acetaminophen Tab 325 Mg Tab)  650 mg PO Q6HR PRN


   PRN Reason: Mild Pain or Fever > 100.5


   Last Admin: 12/18/20 15:28 Dose:  650 mg


   Documented by: 


Ascorbic Acid (Ascorbic Acid 500 Mg Tab)  500 mg PO DAILY [facility]


   Last Admin: 12/18/20 07:29 Dose:  500 mg


   Documented by: 


Bupropion HCl (Bupropion Xl 150 Mg Tab.Er.24h)  150 mg PO DAILY [facility]


   Last Admin: 12/18/20 07:30 Dose:  150 mg


   Documented by: 


Cholecalciferol (Cholecalciferol 1,000 Unit Tab)  1,000 unit PO DAILY [facility]


   Last Admin: 12/18/20 07:30 Dose:  1,000 unit


   Documented by: 


Enoxaparin Sodium (Enoxaparin 40 Mg/0.4 Ml Syringe)  40 mg SQ DAILY [facility]


   Last Admin: 12/18/20 07:29 Dose:  40 mg


   Documented by: 


Famotidine (Famotidine 20 Mg Tab)  20 mg PO BID [facility]


   Last Admin: 12/18/20 21:01 Dose:  20 mg


   Documented by: 


Gabapentin (Gabapentin 300 Mg Cap)  300 mg PO BID [facility]


   Last Admin: 12/18/20 21:00 Dose:  300 mg


   Documented by: 


Ibuprofen (Ibuprofen 400 Mg Tab)  400 mg PO Q6HR PRN


   PRN Reason: Mild Pain or Fever > 100.5


   Last Admin: 12/18/20 04:50 Dose:  400 mg


   Documented by: 


Levothyroxine Sodium (Levothyroxine 100 Mcg Tab)  100 mcg PO DAILY@0630 [facility]


   Last Admin: 12/18/20 05:50 Dose:  100 mcg


   Documented by: 


Methylprednisolone Sodium Succinate (Methylprednisolone Sod Succi 125 Mg/2 Ml 

Vial)  60 mg IV Q6HR [facility]


   Last Admin: 12/18/20 17:46 Dose:  60 mg


   Documented by: 


Naloxone HCl (Naloxone 0.4 Mg/Ml 1 Ml Vial)  0.2 mg IV Q2M PRN


   PRN Reason: Opioid Reversal


Ondansetron HCl (Ondansetron 4 Mg/2 Ml Vial)  4 mg IVP Q8HR PRN


   PRN Reason: Nausea And Vomiting


Oxymetazoline HCl (Oxymetazoline 0.05% Nasl Spray 1 Spray Bottle)  3 spray NASAL

BID PRN


   PRN Reason: CONGESTION


   Last Admin: 12/13/20 13:05 Dose:  3 spray


   Documented by: 


Zinc Sulfate (Zinc Sulfate 220 Mg Cap)  220 mg PO DAILY [facility]


   Last Admin: 12/18/20 07:30 Dose:  220 mg


   Documented by: 














Physical examination:


VITAL SIGNS: 98.8, 96, 24, 1 4694, 93% on 60 L/ 90% FiO2.


GENERAL: Sitting of the edge of the bed, a bit less short of breath


PSYCH: Alert and oriented x3;  mood  and affect tired 


NEUROLOGICAL: Cranial nerves grossly intact.  Moving  limbs


Rest of the exam as per nursing, pulmonary





INVESTIGATIONS, reviewed in the clinical context:


December 17: D-dimer 1.04 CRP 4.4Chest x-ray film personally reviewed by me-

bilateral diffuse infiltrates-slight improvement reported


December 16: D-dimer 1.18, CRP 59.5


December 15: D-dimer 1.71 CRP 4.8 pro-calcitonin 0.03


December 14: D-dimer 1.21 CRP 7.9


December 13: D-dimer 1.17 CRP 16.7 potassium 4 creatinine 0.6


December 12: D-dimer 1.16 CRP 11.7


December 11: D-dimer 1.16 CRP 11.7


December 10: D-dimer 1.47 CRP 22.6


December 9: D-dimer 1.67 CRP 25.3


December 7: D-dimer 0.53 CRP 6.7 pro-calcitonin 0.02


White count 12.6 hemoglobin 15.1 and platelets 472 increased neutrophils


d-dimer 0.38 potassium 4.6 bun 20 creatinine 0.50


CRP 63


Coronavirus PCF-detected


EKG tracing personally reviewed by me-no sinus rhythm


Chest x-ray film personally reviewed by me-bilateral scattered infiltrates





Assessment:


-Bilateral COVID 19 pneumonia - slow to respond


-Possible gram-negative pneumonia, patient has beige sputum and left shift on 

CBC-on ceftriaxone initially then discontinued


-Acute hypoxic respiratory failure from worsening, continue on BiPAP.


-Hypothyroid


-Morbid obesity BMI 41.6





Plan:


On BiPAP.  IV Solu-Medrol  Lovenox.  Other medications to continue.  Discussed 

with patient.  Follow with pulmonary

## 2020-12-18 NOTE — P.PN
Subjective


Progress Note Date: 12/18/20


Principal diagnosis: 





Acute bilateral pneumonia secondary to Covid 19 infection 





65-year-old male patient and the patient came into the emergency department 

yesterday because of generalized weakness shortness of breath.  The patient had 

a pulse ox of 70%.  The patient is a nonsmoker.  Apparently symptoms of been 

progressively getting worse over the past 10 days. [facility] and her symptoms were waxing and waning and progressively 

she got worse and she ultimately had come to the hospital because of worsening 

shortness of breath. He had some fever and chills.  He had diminished appetite. 

He had decreased taste and smell.  The patient tested positive for COVID 19 by 

PCR and the patient is currently being treated for this pneumonia.  The patient 

was admitted because of 12.6.  The patient has a platelet count of 472.  

Preservation.  Positive normal.  D-dimer is not elevated at 0.53.  The ferritin 

level is at 360, the patient has a mild transaminitis with an AST of 76, ALP of 

65 with an LDH of 1038.  CRP is at 63 and appropriate LEVEL IS AT 0.04.  REST X-

RAY SHOWING NEW BILATERAL INTERSTITIAL INFILTRATION PERIHILAR.  THE PATIENT IS 

CURRENTLY ON OXYGEN AND THE PATIENT IS CURRENTLY ON 15 L TO MAINTAIN A 

SATURATION ABOVE 90%.  She is afebrile. 








on today's evaluation of 12/08/2020, the patient is a bit struggling with her 

breathing.  She was able to sit up on a chair or morning.  Noted the patient was

started on a combination of oxygen delivery systems including high flow oxygen 

at 15 L nasal cannula and 100% nonrebreather facemask to maintain a saturation 

between 88-92%.  She has some limited cough.  No significant sputum production. 

She is laying comfortably in bed.  she is having some mild degree of shortness 

of breath even at rest.  She is not using her muscles of breathing.   Note that 

the patient was started on a combination of Decadron and Remdesivir I'm also 

inclining giving her a unit of convalescence as her condition. no nausea.  No 

vomiting.  No diarrhea.  No abdominal pain.  No altered mentation.





on 12/09/2020 16 the patient for a follow-up.  The patient is currently on a 15 

L high flow oxygen.  On and off she is also using the 100% nonrebreather 

facemask.  She is doing well.  No specific complaints.  She is getting less 

short of breath.  Her lungs are less achy on today's evaluation.  No nausea.  No

vomiting.  She is on day 2 of Remdesivir she is also on Decadron.  She receives 

convalescent plasma.  No nausea.  No vomiting.  No diarrhea.





On today's evaluation of 12/10/2020, the patient feels that she is doing 

slightly better.  She still on 100% nonrebreather facemask and 15 L of oxygen by

nasal cannula.  She is afebrile.  She is taken Remdesivir day #3 and Decadron.  

Her inflammatory markers show a d-dimer of 1.47, ferritin level is up to 1059, 

her LDH level is down to 509 and her CRP level is down to 22.6.  No nausea.  No 

vomiting.  No diarrhea.  She was able to sit up on a recliner all day.  

Currently she is back in bed.  No other new complaints otherwise for now.  No 

improvement in her oxygenation. Her chest x-ray from yesterday showed bilateral 

airspace disease and some more confluent density in the upper lobes.





The patient is seen today 12/11/2020 in follow-up on the regular medical floor. 

She is currently resting fairly comfortably in bed.  She's been up in the chair 

most of the day.  She is still requiring 15 L high flow nasal cannula along with

a nonrebreather to maintain O2 saturations in the 90s.  She's been afebrile.  

Hemodynamically stable.  D-dimer 1.16.  Reactive protein 11.7.  She remains on 

Lovenox 100 mg subcu every 12 hours.  Currently on IV Solu-Medrol.  Receiving 

day #4 of Remdesivir.  She was given convalescent plasma as well.  Chest x-ray 

continues to reveal stable diffuse increased lung markings bilaterally 

compatible with atypical pneumonia.





The patient is seen today December 17 2020 in follow-up on the regular medical 

floor.  She is currently sitting up at the bedside.  Awake and alert in no acute

distress.  She is breathing a bit better today compared to yesterday.  She is 

still requiring 60 L and 90% of AirVo high flow oxygen to maintain O2 saturation

in the 90s.  D-dimer 1.04.  C-reactive protein 4.4.  She is complete course of R

emdesivir.  She is received convalescent plasma.  Currently on IV Solu-Medrol.  

Lovenox.  Vitamin supplements.





The patient is seen today 12/18/2020 in follow-up on the regular medical floor. 

She is awake and alert in no acute distress.  Currently sitting up in a chair at

the bedside.  States breathing easier today compared to yesterday.  She remains 

on AirVo high flow at 60 L and 90% FiO2.  She has been slow to progress.  Chest 

x-ray is showing some improvement in the diffuse bilateral lung infiltrates.   

She completed a course of Remdesivir.  She is received convalescent plasma.  

Currently on IV Solu-Medrol.  Lovenox.  Vitamin supplements.





Objective





- Vital Signs


Vital signs: 


                                   Vital Signs











Temp  98.8 F   12/18/20 11:00


 


Pulse  96   12/18/20 11:00


 


Resp  24   12/18/20 11:00


 


BP  146/84   12/18/20 11:00


 


Pulse Ox  93 L  12/18/20 11:00








                                 Intake & Output











 12/17/20 12/18/20 12/18/20





 18:59 06:59 18:59


 


Intake Total 480 1555 


 


Output Total  400 


 


Balance 480 1155 


 


Intake:   


 


  Oral 480 1555 


 


Output:   


 


  Urine  400 


 


Other:   


 


  Voiding Method  Bedside Commode 


 


  # Voids  2 


 


  # Bowel Movements  0 














- Exam





GENERAL EXAM: Alert, very pleasant 65-year-old female patient, on AirVo high 

flow oxygen at 60 L and 90% FiO2, up in a chair at the bedside. 


HEAD: Normocephalic.


EYES: Normal reaction of pupils, equal size.


NOSE: Clear with pink turbinates.


THROAT: No erythema or exudates.


NECK: No masses, no JVD.


CHEST: No chest wall deformity.


LUNGS: Equal air entry with bibasilar crackles, scattered rhonchi


CVS: S1 and S2 normal with no audible murmur, regular rhythm.


ABDOMEN: No hepatosplenomegaly, normal bowel sounds, no guarding or rigidity.


SPINE: No scoliosis or deformity


SKIN: No rashes


CENTRAL NERVOUS SYSTEM: No focal deficits, tone is normal in all 4 extremities.


EXTREMITIES: There is no peripheral edema.  No clubbing, no cyanosis.  

Peripheral pulses are intact.





- Labs


CBC & Chem 7: 


                                 12/13/20 06:40





                                 12/13/20 06:45





Assessment and Plan


Assessment: 





1 Acute bilateral pneumonia secondary to Covid 19 infection .  Completed 

Remdesivir.  Received convalescent plasma.  On Lovenox, IV Solu-Medrol.  The 

patient's oxygenation is unchanged the patient continues to be on same amount of

oxygen which is 100% nonrebreather with AirVo high flow oxygen at 60 L and 90% 

FiO2.  Today's chest x-ray is showing improvement in the bilateral infiltrates.





2 Acute hypoxic respiratory failure currently on AirVo high flow nasal cannula, 

in addition to 100% nonrebreather facemask.





3 Elevated inflammatory markers, mild





4 Obesity





5 Chronic back pain





Plan





The patient was seen and evaluated by Dr. Logan


Chest x-ray reviewed, showing slight improvement


Remains on IV Solu-Medrol, Lovenox, vitamin supplements


Titrate the FiO2 as tolerated


We will continue to follow and make further recommendations based on her 

clinical status





I, the cosigning physician, performed a history & physical examination of the 

patient. Lungs sounds with by basilar crackles, scattered rhonchi.  Maintaining 

good O2 saturations in the 90s on AirVo high flow at 60 L and 90% FiO2 with 

nonrebreather mask.  I discussed the assessment and plan of care with my nurse 

practitioner, Ariella Epstein. I attest to the above note as dictated by her.

## 2020-12-19 RX ADMIN — GABAPENTIN SCH MG: 300 CAPSULE ORAL at 08:28

## 2020-12-19 RX ADMIN — METHYLPREDNISOLONE SODIUM SUCCINATE SCH MG: 125 INJECTION, POWDER, FOR SOLUTION INTRAMUSCULAR; INTRAVENOUS at 17:15

## 2020-12-19 RX ADMIN — ENOXAPARIN SODIUM SCH MG: 40 INJECTION SUBCUTANEOUS at 08:28

## 2020-12-19 RX ADMIN — METHYLPREDNISOLONE SODIUM SUCCINATE SCH MG: 125 INJECTION, POWDER, FOR SOLUTION INTRAMUSCULAR; INTRAVENOUS at 23:43

## 2020-12-19 RX ADMIN — Medication SCH MG: at 08:28

## 2020-12-19 RX ADMIN — GABAPENTIN SCH MG: 300 CAPSULE ORAL at 20:24

## 2020-12-19 RX ADMIN — NYSTATIN SCH UNIT: 100000 SUSPENSION ORAL at 17:15

## 2020-12-19 RX ADMIN — FAMOTIDINE SCH MG: 20 TABLET, FILM COATED ORAL at 08:28

## 2020-12-19 RX ADMIN — Medication SCH UNIT: at 08:28

## 2020-12-19 RX ADMIN — METHYLPREDNISOLONE SODIUM SUCCINATE SCH MG: 125 INJECTION, POWDER, FOR SOLUTION INTRAMUSCULAR; INTRAVENOUS at 06:26

## 2020-12-19 RX ADMIN — IBUPROFEN PRN MG: 400 TABLET, FILM COATED ORAL at 09:20

## 2020-12-19 RX ADMIN — FAMOTIDINE SCH MG: 20 TABLET, FILM COATED ORAL at 20:24

## 2020-12-19 RX ADMIN — ACETAMINOPHEN PRN MG: 325 TABLET, FILM COATED ORAL at 11:32

## 2020-12-19 RX ADMIN — OXYCODONE HYDROCHLORIDE AND ACETAMINOPHEN SCH MG: 500 TABLET ORAL at 08:28

## 2020-12-19 RX ADMIN — BUPROPION HYDROCHLORIDE SCH MG: 150 TABLET, FILM COATED, EXTENDED RELEASE ORAL at 08:28

## 2020-12-19 RX ADMIN — NYSTATIN SCH UNIT: 100000 SUSPENSION ORAL at 21:31

## 2020-12-19 RX ADMIN — FUROSEMIDE SCH MG: 10 INJECTION, SOLUTION INTRAMUSCULAR; INTRAVENOUS at 11:32

## 2020-12-19 RX ADMIN — LEVOTHYROXINE SODIUM SCH MCG: 100 TABLET ORAL at 05:35

## 2020-12-19 RX ADMIN — METHYLPREDNISOLONE SODIUM SUCCINATE SCH MG: 125 INJECTION, POWDER, FOR SOLUTION INTRAMUSCULAR; INTRAVENOUS at 11:32

## 2020-12-19 RX ADMIN — FUROSEMIDE SCH MG: 10 INJECTION, SOLUTION INTRAMUSCULAR; INTRAVENOUS at 20:24

## 2020-12-19 RX ADMIN — ACETAMINOPHEN PRN MG: 325 TABLET, FILM COATED ORAL at 20:24

## 2020-12-19 RX ADMIN — ACETAMINOPHEN PRN MG: 325 TABLET, FILM COATED ORAL at 04:05

## 2020-12-19 NOTE — P.PN
Subjective


Progress Note Date: 12/19/20


Principal diagnosis: 





sob





Patient is feeling better overall.  She complained about having to urinate 

frequently since her Lasix was started.  She complained that she cannot breathe 

through her nose due to ''blockage by clot''.  No pain or significant shortness 

of breath.  No fevers or chills.  No nausea or vomiting.





Objective





- Vital Signs


Vital signs: 


                                   Vital Signs











Temp  98.0 F   12/19/20 13:45


 


Pulse  88   12/19/20 13:45


 


Resp  20   12/19/20 13:45


 


BP  127/64   12/19/20 13:45


 


Pulse Ox  87 L  12/19/20 16:05








                                 Intake & Output











 12/18/20 12/19/20 12/19/20





 18:59 06:59 18:59


 


Intake Total   213


 


Balance   213


 


Intake:   


 


  Blood Product   213


 


    Ffp Pher Conval Covid19   213





    Acda 1  Unit   





    [id number]   


 


Other:   


 


  # Voids 4 4 


 


  # Bowel Movements  1 














- Exam








Constitutional: No acute distress, conversant, pleasant


Eyes:Anicteric sclerae, moist conjunctiva, no lid-lag, PERRLA, 


ENMT: Oropharynx clear, no erythema, exudates


Neck: Supple, FROM, no masses, or JVD, No carotid bruits, No thyromegaly


Lungs: Clear to auscultation, Clear to percussion, Normal respiratory effort, no

accessory muscle use 


Cardiovascular: Heart regular in rate and rhythm, No murmurs, gallops, or rubs, 

No peripheral edema


Abdominal: Soft, Nontender, no guarding, rebound or rigidity, Normoactive bowel 

sounds, No hepatomegaly, No splenomegaly, No palpable mass 


Skin: Normal temperature, tone, texture, turgor, no induration, No subcutaneous 

nodules, No rash, lesions, No ulcers


Extremities: No digital cyanosis, No clubbing, Pedal pulses intact and symmet

rical, Radial pulses intact and symmetrical, No calf tenderness 


Psychiatric: Alert and oriented to person, place and time, appropriate affect, 

intact judgement         


Neuro: Muscles Strength 5/5 in all 4 extremities, Sensation to light touch 

grossly present throughout, Cranial nerves II-XII grossly intact, no focal 

sensory deficits








- Labs


CBC & Chem 7: 


                                 12/13/20 06:40





                                 12/13/20 06:45





Assessment and Plan


Plan: 





-Bilateral COVID 19 pneumonia - slow to respond


-Acute hypoxic respiratory failure was on BiPAP now on Airvo 60L as well as NRB 

mask.


-Hypothyroid


-Morbid obesity BMI 41.6





Plan:


Patient continues to be hypoxic, continue oxygen supplements


Saline nasal spray for nasal congestion


She completed a course of Remdesivir.  


Getting second dose of convalescent plasma today.


Continue on IV Solu-Medrol.  Lovenox.  Vitamin supplements.


Pulmonary service following, Lasix added by pulmonary due to leg swelling and 

suspected pulmonary edema


Continue rest of her medications.


Structured outpatient weight loss

## 2020-12-19 NOTE — P.PN
Subjective


Progress Note Date: 12/19/20


Principal diagnosis: 





Acute bilateral pneumonia secondary to Covid 19 infection 





65-year-old male patient and the patient came into the emergency department 

yesterday because of generalized weakness shortness of breath.  The patient had 

a pulse ox of 70%.  The patient is a nonsmoker.  Apparently symptoms of been 

progressively getting worse over the past 10 days. [facility] and her symptoms were waxing and waning and progressively 

she got worse and she ultimately had come to the hospital because of worsening 

shortness of breath. He had some fever and chills.  He had diminished appetite. 

He had decreased taste and smell.  The patient tested positive for COVID 19 by 

PCR and the patient is currently being treated for this pneumonia.  The patient 

was admitted because of 12.6.  The patient has a platelet count of 472.  

Preservation.  Positive normal.  D-dimer is not elevated at 0.53.  The ferritin 

level is at 360, the patient has a mild transaminitis with an AST of 76, ALP of 

65 with an LDH of 1038.  CRP is at 63 and appropriate LEVEL IS AT 0.04.  REST X-

RAY SHOWING NEW BILATERAL INTERSTITIAL INFILTRATION PERIHILAR.  THE PATIENT IS 

CURRENTLY ON OXYGEN AND THE PATIENT IS CURRENTLY ON 15 L TO MAINTAIN A 

SATURATION ABOVE 90%.  She is afebrile. 








on today's evaluation of 12/08/2020, the patient is a bit struggling with her 

breathing.  She was able to sit up on a chair or morning.  Noted the patient was

started on a combination of oxygen delivery systems including high flow oxygen 

at 15 L nasal cannula and 100% nonrebreather facemask to maintain a saturation 

between 88-92%.  She has some limited cough.  No significant sputum production. 

She is laying comfortably in bed.  she is having some mild degree of shortness 

of breath even at rest.  She is not using her muscles of breathing.   Note that 

the patient was started on a combination of Decadron and Remdesivir I'm also 

inclining giving her a unit of convalescence as her condition. no nausea.  No 

vomiting.  No diarrhea.  No abdominal pain.  No altered mentation.





on 12/09/2020 16 the patient for a follow-up.  The patient is currently on a 15 

L high flow oxygen.  On and off she is also using the 100% nonrebreather 

facemask.  She is doing well.  No specific complaints.  She is getting less 

short of breath.  Her lungs are less achy on today's evaluation.  No nausea.  No

vomiting.  She is on day 2 of Remdesivir she is also on Decadron.  She receives 

convalescent plasma.  No nausea.  No vomiting.  No diarrhea.





On today's evaluation of 12/10/2020, the patient feels that she is doing 

slightly better.  She still on 100% nonrebreather facemask and 15 L of oxygen by

nasal cannula.  She is afebrile.  She is taken Remdesivir day #3 and Decadron.  

Her inflammatory markers show a d-dimer of 1.47, ferritin level is up to 1059, 

her LDH level is down to 509 and her CRP level is down to 22.6.  No nausea.  No 

vomiting.  No diarrhea.  She was able to sit up on a recliner all day.  

Currently she is back in bed.  No other new complaints otherwise for now.  No 

improvement in her oxygenation. Her chest x-ray from yesterday showed bilateral 

airspace disease and some more confluent density in the upper lobes.





The patient is seen today 12/11/2020 in follow-up on the regular medical floor. 

She is currently resting fairly comfortably in bed.  She's been up in the chair 

most of the day.  She is still requiring 15 L high flow nasal cannula along with

a nonrebreather to maintain O2 saturations in the 90s.  She's been afebrile.  

Hemodynamically stable.  D-dimer 1.16.  Reactive protein 11.7.  She remains on 

Lovenox 100 mg subcu every 12 hours.  Currently on IV Solu-Medrol.  Receiving 

day #4 of Remdesivir.  She was given convalescent plasma as well.  Chest x-ray 

continues to reveal stable diffuse increased lung markings bilaterally 

compatible with atypical pneumonia.





The patient is seen today December 17 2020 in follow-up on the regular medical 

floor.  She is currently sitting up at the bedside.  Awake and alert in no acute

distress.  She is breathing a bit better today compared to yesterday.  She is 

still requiring 60 L and 90% of AirVo high flow oxygen to maintain O2 saturation

in the 90s.  D-dimer 1.04.  C-reactive protein 4.4.  She is complete course of R

emdesivir.  She is received convalescent plasma.  Currently on IV Solu-Medrol.  

Lovenox.  Vitamin supplements.





The patient is seen today 12/18/2020 in follow-up on the regular medical floor. 

She is awake and alert in no acute distress.  Currently sitting up in a chair at

the bedside.  States breathing easier today compared to yesterday.  She remains 

on AirVo high flow at 60 L and 90% FiO2.  She has been slow to progress.  Chest 

x-ray is showing some improvement in the diffuse bilateral lung infiltrates.   

She completed a course of Remdesivir.  She is received convalescent plasma.  

Currently on IV Solu-Medrol.  Lovenox.  Vitamin supplements.





The patient is seen again today 12/19/2020 in follow-up on the regular medical 

floor.  She is currently sitting up at the bedside.  Awake and alert in no acute

distress.  She feels she is breathing a bit better today compared to yesterday. 

She does still require 60 L and 90% FiO2 on the AirVo high flow oxygen device 

along with a nonrebreather mask.  She is noted to have some increased edema of 

the lower extremities.  She has basilar crackles and bronchial breath sounds 

noted.  She remains on Lovenox, IV Solu-Medrol, and vitamin supplements.





Objective





- Vital Signs


Vital signs: 


                                   Vital Signs











Temp  98.0 F   12/19/20 12:40


 


Pulse  65   12/19/20 12:40


 


Resp  18   12/19/20 12:40


 


BP  141/70   12/19/20 12:40


 


Pulse Ox  91 L  12/19/20 12:40








                                 Intake & Output











 12/18/20 12/19/20 12/19/20





 18:59 06:59 18:59


 


Intake Total   0


 


Balance   0


 


Intake:   


 


  Blood Product   0


 


    Ffp Pher Conval Covid19   0





    Acda 1  Unit   





    [id number]   


 


Other:   


 


  # Voids 4 4 


 


  # Bowel Movements  1 














- Exam





GENERAL EXAM: Alert, very pleasant 65-year-old female patient, on AirVo high 

flow oxygen at 60 L and 90% FiO2, up in a chair at the bedside. 


HEAD: Normocephalic.


EYES: Normal reaction of pupils, equal size.


NOSE: Clear with pink turbinates.


THROAT: No erythema or exudates.


NECK: No masses, no JVD.


CHEST: No chest wall deformity.


LUNGS: Equal air entry with bibasilar crackles, scattered rhonchi


CVS: S1 and S2 normal with no audible murmur, regular rhythm.


ABDOMEN: No hepatosplenomegaly, normal bowel sounds, no guarding or rigidity.


SPINE: No scoliosis or deformity


SKIN: No rashes


CENTRAL NERVOUS SYSTEM: No focal deficits, tone is normal in all 4 extremities.


EXTREMITIES: There is 1-2+ peripheral edema.  No clubbing, no cyanosis.  

Peripheral pulses are intact.





- Labs


CBC & Chem 7: 


                                 12/13/20 06:40





                                 12/13/20 06:45





Assessment and Plan


Assessment: 





1 Acute bilateral pneumonia secondary to Covid 19 infection .  Completed 

Remdesivir.  Received convalescent plasma x 2.  On Lovenox, IV Solu-Medrol.  The

patient's oxygenation is unchanged the patient continues to be on same amount of

oxygen which is 100% nonrebreather with AirVo high flow oxygen at 60 L and 90% 

FiO2.  





2 Acute hypoxic respiratory failure currently on AirVo high flow nasal cannula, 

in addition to 100% nonrebreather facemask.





3 Elevated inflammatory markers, mild





4 Obesity





5 Chronic back pain





6 Peripheral edema





Plan





The patient was seen and evaluated by Dr. Logan


Add Lasix 40 mg IVP every 12 hours.


Give second dose of convalescent plasma


Repeat chest x-ray in a.m.


Remains on IV Solu-Medrol, Lovenox, vitamin supplements


Titrate the FiO2 as tolerated


We will continue to follow and make further recommendations based on her 

clinical status





I, the cosigning physician, performed a history & physical examination of the 

patient. Lungs sounds with by basilar crackles, scattered rhonchi.  Maintaining 

good O2 saturations in the 90s on AirVo high flow at 60 L and 90% FiO2 with 

nonrebreather mask.  I discussed the assessment and plan of care with my nurse 

practitioner, Ariella Epstein. I attest to the above note as dictated by her.

## 2020-12-19 NOTE — PN
PROGRESS NOTE



DATE OF SERVICE:

12/18/2020



REASON FOR FOLLOWUP:

Covid 19 pneumonia.



INTERVAL HISTORY:

The patient is currently afebrile.  She did mention she was feeling slightly better and

she was able to take deep breath and get up and walk without any significant shortness

of breath.  Denies any chest pain.  Minimal cough.  No abdominal pain. No diarrhea.



PHYSICAL EXAMINATION:

Blood pressure 114/73 with a pulse of 95.  Temperature 97.  She is 94% on 55% FiO2.

General description is a middle-aged female up in the bed in no distress.  Respiratory

system: Unlabored breathing.  Coarse crackles in the right base.  Heart S1, S2.

Regular rate and rhythm. Abdomen soft. No tenderness.



LABS:

BUN of 17, creatinine 0.6. _____ 46.  CRP is 16.7 _____.



DIAGNOSTIC IMPRESSION AND PLAN:

Patient with acute COVID-19 infection.  Minimal clinical improvement.  The patient has

complete Remdesivir therapy, currently on Lovenox, Solu Medrol and zinc to continue and

continue supportive care.





MMODL / IJN: [id number] / Job#: [id number] Suturegard Body: The suture ends were repeatedly re-tightened and re-clamped to achieve the desired tissue expansion.

## 2020-12-19 NOTE — PN
PROGRESS NOTE



DATE OF SERVICE:

12/19/2020



REASON FOR FOLLOWUP:

COVID-19 pneumonia.



INTERVAL HISTORY:

The patient is currently afebrile.  The patient is feeling slightly better.  Patient is

breathing more comfortably, still requiring non-rebreather, _____ high-flow oxygen.

Denies having chest pain.  Minimal cough.  No abdominal pain.  No diarrhea.



EXAMINATION:

Blood pressure 143/72 with a pulse of 88, temperature of 97.9.  She is 90% on high-flow

oxygen. General description is a middle-aged female lying in bed in no distress.

Respiratory system: Unlabored breathing.  Few coarse breath sounds at the base.  Heart

S1, S2.  Regular rate and rhythm. Abdomen soft, no tenderness.



LABS:

No new labs have been obtained today.



DIAGNOSTIC IMPRESSION AND PLAN:

Patient with acute COVID-19 pneumonia in this patient _____ currently with Lovenox,

Solu-Medrol and zinc to continue along with respiratory support and monitor clinical

course closely.





MMODL / IJN: [id number] / Job#: [id number]

## 2020-12-20 VITALS — SYSTOLIC BLOOD PRESSURE: 105 MMHG | TEMPERATURE: 99.1 F | DIASTOLIC BLOOD PRESSURE: 78 MMHG

## 2020-12-20 VITALS — RESPIRATION RATE: 36 BRPM

## 2020-12-20 LAB
ALBUMIN SERPL-MCNC: 3.2 G/DL (ref 3.5–5)
ALBUMIN SERPL-MCNC: 3.5 G/DL (ref 3.8–4.9)
ALBUMIN/GLOB SERPL: 1.84 G/DL (ref 1.6–3.17)
ALP SERPL-CCNC: 82 U/L (ref 41–126)
ALP SERPL-CCNC: 98 U/L (ref 38–126)
ALT SERPL-CCNC: 58 U/L (ref 8–44)
ALT SERPL-CCNC: 69 U/L (ref 4–34)
ANION GAP SERPL CALC-SCNC: 5 MMOL/L
ANION GAP SERPL CALC-SCNC: 8 MMOL/L (ref 4–12)
AST SERPL-CCNC: 29 U/L (ref 13–35)
AST SERPL-CCNC: 47 U/L (ref 14–36)
BASOPHILS # BLD AUTO: 0 K/UL (ref 0–0.2)
BASOPHILS NFR BLD AUTO: 0 %
BASOPHILS NFR BLD AUTO: 0 %
BASOPHILS NFR BLD AUTO: 1 %
BUN SERPL-SCNC: 19 MG/DL (ref 7–17)
BUN SERPL-SCNC: 21 MG/DL (ref 9–27)
BUN/CREAT SERPL: 42 RATIO (ref 12–20)
CALCIUM SPEC-MCNC: 8.4 MG/DL (ref 8.4–10.2)
CALCIUM SPEC-MCNC: 8.7 MG/DL (ref 8.7–10.3)
CHLORIDE SERPL-SCNC: 88 MMOL/L (ref 98–107)
CHLORIDE SERPL-SCNC: 95 MMOL/L (ref 96–109)
CO2 BLDA-SCNC: 37 MMOL/L (ref 19–24)
CO2 BLDA-SCNC: 39 MMOL/L (ref 19–24)
CO2 BLDA-SCNC: 39 MMOL/L (ref 19–24)
CO2 BLDA-SCNC: 43 MMOL/L (ref 19–24)
CO2 SERPL-SCNC: 37 MMOL/L (ref 21.6–31.8)
CO2 SERPL-SCNC: 42 MMOL/L (ref 22–30)
EOSINOPHIL # BLD AUTO: 0 K/UL (ref 0–0.7)
EOSINOPHIL # BLD AUTO: 0 K/UL (ref 0–0.7)
EOSINOPHIL # BLD AUTO: 0.1 K/UL (ref 0–0.7)
EOSINOPHIL NFR BLD AUTO: 0 %
EOSINOPHIL NFR BLD AUTO: 1 %
EOSINOPHIL NFR BLD AUTO: 1 %
ERYTHROCYTE [DISTWIDTH] IN BLOOD BY AUTOMATED COUNT: 4.73 M/UL (ref 3.8–5.4)
ERYTHROCYTE [DISTWIDTH] IN BLOOD BY AUTOMATED COUNT: 4.89 M/UL (ref 3.8–5.4)
ERYTHROCYTE [DISTWIDTH] IN BLOOD BY AUTOMATED COUNT: 5.04 M/UL (ref 3.8–5.4)
ERYTHROCYTE [DISTWIDTH] IN BLOOD: 13.8 % (ref 11.5–15.5)
ERYTHROCYTE [DISTWIDTH] IN BLOOD: 13.9 % (ref 11.5–15.5)
ERYTHROCYTE [DISTWIDTH] IN BLOOD: 13.9 % (ref 11.5–15.5)
GLOBULIN SER CALC-MCNC: 1.9 G/DL (ref 1.6–3.3)
GLUCOSE BLD-MCNC: 150 MG/DL (ref 75–99)
GLUCOSE BLD-MCNC: 170 MG/DL (ref 75–99)
GLUCOSE BLD-MCNC: 227 MG/DL (ref 75–99)
GLUCOSE SERPL-MCNC: 211 MG/DL (ref 70–110)
GLUCOSE SERPL-MCNC: 241 MG/DL (ref 74–99)
HCO3 BLDA-SCNC: 34 MMOL/L (ref 21–25)
HCO3 BLDA-SCNC: 36 MMOL/L (ref 21–25)
HCO3 BLDA-SCNC: 37 MMOL/L (ref 21–25)
HCO3 BLDA-SCNC: 41 MMOL/L (ref 21–25)
HCT VFR BLD AUTO: 44.6 % (ref 34–46)
HCT VFR BLD AUTO: 45.1 % (ref 34–46)
HCT VFR BLD AUTO: 45.9 % (ref 34–46)
HGB BLD-MCNC: 13.8 GM/DL (ref 11.4–16)
HGB BLD-MCNC: 14.3 GM/DL (ref 11.4–16)
HGB BLD-MCNC: 14.5 GM/DL (ref 11.4–16)
INR PPP: 1.1 (ref ?–1.2)
LYMPHOCYTES # SPEC AUTO: 0.2 K/UL (ref 1–4.8)
LYMPHOCYTES # SPEC AUTO: 0.2 K/UL (ref 1–4.8)
LYMPHOCYTES # SPEC AUTO: 0.3 K/UL (ref 1–4.8)
LYMPHOCYTES NFR SPEC AUTO: 1 %
LYMPHOCYTES NFR SPEC AUTO: 11 %
LYMPHOCYTES NFR SPEC AUTO: 2 %
MAGNESIUM SPEC-SCNC: 2.1 MG/DL (ref 1.5–2.4)
MCH RBC QN AUTO: 28.8 PG (ref 25–35)
MCH RBC QN AUTO: 29.2 PG (ref 25–35)
MCH RBC QN AUTO: 29.3 PG (ref 25–35)
MCHC RBC AUTO-ENTMCNC: 30.7 G/DL (ref 31–37)
MCHC RBC AUTO-ENTMCNC: 31.6 G/DL (ref 31–37)
MCHC RBC AUTO-ENTMCNC: 32.1 G/DL (ref 31–37)
MCV RBC AUTO: 91.1 FL (ref 80–100)
MCV RBC AUTO: 91.2 FL (ref 80–100)
MCV RBC AUTO: 95.3 FL (ref 80–100)
MONOCYTES # BLD AUTO: 0.1 K/UL (ref 0–1)
MONOCYTES # BLD AUTO: 0.5 K/UL (ref 0–1)
MONOCYTES # BLD AUTO: 0.5 K/UL (ref 0–1)
MONOCYTES NFR BLD AUTO: 3 %
NEUTROPHILS # BLD AUTO: 1.7 K/UL (ref 1.3–7.7)
NEUTROPHILS # BLD AUTO: 17.3 K/UL (ref 1.3–7.7)
NEUTROPHILS # BLD AUTO: 17.7 K/UL (ref 1.3–7.7)
NEUTROPHILS NFR BLD AUTO: 83 %
NEUTROPHILS NFR BLD AUTO: 95 %
NEUTROPHILS NFR BLD AUTO: 96 %
PCO2 BLDA: 53 MMHG (ref 35–45)
PCO2 BLDA: 68 MMHG (ref 35–45)
PCO2 BLDA: 74 MMHG (ref 35–45)
PCO2 BLDA: 78 MMHG (ref 35–45)
PH BLDA: 7.26 [PH] (ref 7.35–7.45)
PH BLDA: 7.3 [PH] (ref 7.35–7.45)
PH BLDA: 7.35 [PH] (ref 7.35–7.45)
PH BLDA: 7.5 [PH] (ref 7.35–7.45)
PLATELET # BLD AUTO: 374 K/UL (ref 150–450)
PLATELET # BLD AUTO: 420 K/UL (ref 150–450)
PLATELET # BLD AUTO: 451 K/UL (ref 150–450)
PO2 BLDA: 50 MMHG (ref 83–108)
PO2 BLDA: 51 MMHG (ref 83–108)
PO2 BLDA: 54 MMHG (ref 83–108)
PO2 BLDA: 68 MMHG (ref 83–108)
POTASSIUM SERPL-SCNC: 3.4 MMOL/L (ref 3.5–5.1)
POTASSIUM SERPL-SCNC: 3.5 MMOL/L (ref 3.5–5.5)
PROT SERPL-MCNC: 5.4 G/DL (ref 6.2–8.2)
PROT SERPL-MCNC: 6 G/DL (ref 6.3–8.2)
PT BLD: 11.2 SEC (ref 9–12)
SODIUM SERPL-SCNC: 135 MMOL/L (ref 137–145)
SODIUM SERPL-SCNC: 140 MMOL/L (ref 135–145)
WBC # BLD AUTO: 18.2 K/UL (ref 3.8–10.6)
WBC # BLD AUTO: 18.6 K/UL (ref 3.8–10.6)
WBC # BLD AUTO: 2 K/UL (ref 3.8–10.6)

## 2020-12-20 PROCEDURE — 4A133J1 MONITORING OF ARTERIAL PULSE, PERIPHERAL, PERCUTANEOUS APPROACH: ICD-10-PCS

## 2020-12-20 PROCEDURE — 02HV33Z INSERTION OF INFUSION DEVICE INTO SUPERIOR VENA CAVA, PERCUTANEOUS APPROACH: ICD-10-PCS

## 2020-12-20 PROCEDURE — 0BH17EZ INSERTION OF ENDOTRACHEAL AIRWAY INTO TRACHEA, VIA NATURAL OR ARTIFICIAL OPENING: ICD-10-PCS

## 2020-12-20 PROCEDURE — 5A1935Z RESPIRATORY VENTILATION, LESS THAN 24 CONSECUTIVE HOURS: ICD-10-PCS

## 2020-12-20 PROCEDURE — 03HY32Z INSERTION OF MONITORING DEVICE INTO UPPER ARTERY, PERCUTANEOUS APPROACH: ICD-10-PCS

## 2020-12-20 PROCEDURE — 4A133B1 MONITORING OF ARTERIAL PRESSURE, PERIPHERAL, PERCUTANEOUS APPROACH: ICD-10-PCS

## 2020-12-20 RX ADMIN — POTASSIUM CHLORIDE SCH MLS/HR: 14.9 INJECTION, SOLUTION INTRAVENOUS at 14:16

## 2020-12-20 RX ADMIN — INSULIN ASPART SCH UNIT: 100 INJECTION, SOLUTION INTRAVENOUS; SUBCUTANEOUS at 12:18

## 2020-12-20 RX ADMIN — FUROSEMIDE SCH: 10 INJECTION, SOLUTION INTRAMUSCULAR; INTRAVENOUS at 09:07

## 2020-12-20 RX ADMIN — OXYCODONE HYDROCHLORIDE AND ACETAMINOPHEN SCH MG: 500 TABLET ORAL at 08:02

## 2020-12-20 RX ADMIN — METHYLPREDNISOLONE SODIUM SUCCINATE SCH MG: 125 INJECTION, POWDER, FOR SOLUTION INTRAMUSCULAR; INTRAVENOUS at 18:01

## 2020-12-20 RX ADMIN — FAMOTIDINE SCH MG: 20 TABLET, FILM COATED ORAL at 20:21

## 2020-12-20 RX ADMIN — ENOXAPARIN SODIUM SCH MG: 40 INJECTION SUBCUTANEOUS at 08:02

## 2020-12-20 RX ADMIN — Medication SCH MG: at 08:02

## 2020-12-20 RX ADMIN — NYSTATIN SCH: 100000 SUSPENSION ORAL at 17:48

## 2020-12-20 RX ADMIN — NYSTATIN SCH: 100000 SUSPENSION ORAL at 11:43

## 2020-12-20 RX ADMIN — BUPROPION HYDROCHLORIDE SCH MG: 150 TABLET, FILM COATED, EXTENDED RELEASE ORAL at 08:02

## 2020-12-20 RX ADMIN — FUROSEMIDE SCH MG: 10 INJECTION, SOLUTION INTRAMUSCULAR; INTRAVENOUS at 20:22

## 2020-12-20 RX ADMIN — FAMOTIDINE SCH MG: 20 TABLET, FILM COATED ORAL at 08:02

## 2020-12-20 RX ADMIN — POTASSIUM CHLORIDE SCH MLS/HR: 14.9 INJECTION, SOLUTION INTRAVENOUS at 12:18

## 2020-12-20 RX ADMIN — ACETAMINOPHEN PRN MG: 325 TABLET, FILM COATED ORAL at 03:01

## 2020-12-20 RX ADMIN — NYSTATIN SCH: 100000 SUSPENSION ORAL at 21:28

## 2020-12-20 RX ADMIN — INSULIN ASPART SCH UNIT: 100 INJECTION, SOLUTION INTRAVENOUS; SUBCUTANEOUS at 21:33

## 2020-12-20 RX ADMIN — INSULIN ASPART SCH UNIT: 100 INJECTION, SOLUTION INTRAVENOUS; SUBCUTANEOUS at 16:33

## 2020-12-20 RX ADMIN — Medication SCH UNIT: at 08:02

## 2020-12-20 RX ADMIN — GABAPENTIN SCH MG: 300 CAPSULE ORAL at 08:02

## 2020-12-20 RX ADMIN — LEVOTHYROXINE SODIUM SCH MCG: 100 TABLET ORAL at 05:17

## 2020-12-20 RX ADMIN — NYSTATIN SCH UNIT: 100000 SUSPENSION ORAL at 08:02

## 2020-12-20 RX ADMIN — GABAPENTIN SCH MG: 300 CAPSULE ORAL at 20:21

## 2020-12-20 RX ADMIN — METHYLPREDNISOLONE SODIUM SUCCINATE SCH MG: 125 INJECTION, POWDER, FOR SOLUTION INTRAMUSCULAR; INTRAVENOUS at 11:58

## 2020-12-20 RX ADMIN — METHYLPREDNISOLONE SODIUM SUCCINATE SCH MG: 125 INJECTION, POWDER, FOR SOLUTION INTRAMUSCULAR; INTRAVENOUS at 05:17

## 2020-12-20 RX ADMIN — IBUPROFEN PRN MG: 400 TABLET, FILM COATED ORAL at 06:12

## 2020-12-20 RX ADMIN — FUROSEMIDE SCH MG: 10 INJECTION, SOLUTION INTRAMUSCULAR; INTRAVENOUS at 08:03

## 2020-12-20 NOTE — P.PN
Subjective


Progress Note Date: 12/20/20


Principal diagnosis: 





Acute hypoxic respiratory failure secondary to covid19 pneumonitis and ARDS


65-year-old male patient and the patient came into the emergency department 

yesterday because of generalized weakness shortness of breath.  The patient had 

a pulse ox of 70%.  The patient is a nonsmoker.  Apparently symptoms of been 

progressively getting worse over the past 10 days. [facility] and her symptoms were waxing and waning and progressively 

she got worse and she ultimately had come to the hospital because of worsening 

shortness of breath. He had some fever and chills.  He had diminished appetite. 

He had decreased taste and smell.  The patient tested positive for COVID 19 by 

PCR and the patient is currently being treated for this pneumonia.  The patient 

was admitted because of 12.6.  The patient has a platelet count of 472.  Prese

rvation.  Positive normal.  D-dimer is not elevated at 0.53.  The ferritin level

is at 360, the patient has a mild transaminitis with an AST of 76, ALP of 65 

with an LDH of 1038.  CRP is at 63 and appropriate LEVEL IS AT 0.04.  REST X-RAY

SHOWING NEW BILATERAL INTERSTITIAL INFILTRATION PERIHILAR.  THE PATIENT IS 

CURRENTLY ON OXYGEN AND THE PATIENT IS CURRENTLY ON 15 L TO MAINTAIN A 

SATURATION ABOVE 90%.  She is afebrile. 








on today's evaluation of 12/08/2020, the patient is a bit struggling with her 

breathing.  She was able to sit up on a chair or morning.  Noted the patient was

started on a combination of oxygen delivery systems including high flow oxygen 

at 15 L nasal cannula and 100% nonrebreather facemask to maintain a saturation 

between 88-92%.  She has some limited cough.  No significant sputum production. 

She is laying comfortably in bed.  she is having some mild degree of shortness 

of breath even at rest.  She is not using her muscles of breathing.   Note that 

the patient was started on a combination of Decadron and Remdesivir I'm also 

inclining giving her a unit of convalescence as her condition. no nausea.  No 

vomiting.  No diarrhea.  No abdominal pain.  No altered mentation.





on 12/09/2020 16 the patient for a follow-up.  The patient is currently on a 15 

L high flow oxygen.  On and off she is also using the 100% nonrebreather 

facemask.  She is doing well.  No specific complaints.  She is getting less 

short of breath.  Her lungs are less achy on today's evaluation.  No nausea.  No

vomiting.  She is on day 2 of Remdesivir she is also on Decadron.  She receives 

convalescent plasma.  No nausea.  No vomiting.  No diarrhea.





On today's evaluation of 12/10/2020, the patient feels that she is doing 

slightly better.  She still on 100% nonrebreather facemask and 15 L of oxygen by

nasal cannula.  She is afebrile.  She is taken Remdesivir day #3 and Decadron.  

Her inflammatory markers show a d-dimer of 1.47, ferritin level is up to 1059, 

her LDH level is down to 509 and her CRP level is down to 22.6.  No nausea.  No 

vomiting.  No diarrhea.  She was able to sit up on a recliner all day.  

Currently she is back in bed.  No other new complaints otherwise for now.  No 

improvement in her oxygenation. Her chest x-ray from yesterday showed bilateral 

airspace disease and some more confluent density in the upper lobes.





The patient is seen today 12/11/2020 in follow-up on the regular medical floor. 

She is currently resting fairly comfortably in bed.  She's been up in the chair 

most of the day.  She is still requiring 15 L high flow nasal cannula along with

a nonrebreather to maintain O2 saturations in the 90s.  She's been afebrile.  

Hemodynamically stable.  D-dimer 1.16.  Reactive protein 11.7.  She remains on 

Lovenox 100 mg subcu every 12 hours.  Currently on IV Solu-Medrol.  Receiving 

day #4 of Remdesivir.  She was given convalescent plasma as well.  Chest x-ray 

continues to reveal stable diffuse increased lung markings bilaterally 

compatible with atypical pneumonia.





The patient is seen today December 17 2020 in follow-up on the regular medical 

floor.  She is currently sitting up at the bedside.  Awake and alert in no acute

distress.  She is breathing a bit better today compared to yesterday.  She is 

still requiring 60 L and 90% of AirVo high flow oxygen to maintain O2 saturation

in the 90s.  D-dimer 1.04.  C-reactive protein 4.4.  She is complete course of 

Remdesivir.  She is received convalescent plasma.  Currently on IV Solu-Medrol. 

Lovenox.  Vitamin supplements.





The patient is seen today 12/18/2020 in follow-up on the regular medical floor. 

She is awake and alert in no acute distress.  Currently sitting up in a chair at

the bedside.  States breathing easier today compared to yesterday.  She remains 

on AirVo high flow at 60 L and 90% FiO2.  She has been slow to progress.  Chest 

x-ray is showing some improvement in the diffuse bilateral lung infiltrates.   

She completed a course of Remdesivir.  She is received convalescent plasma.  

Currently on IV Solu-Medrol.  Lovenox.  Vitamin supplements.





The patient is seen again today 12/19/2020 in follow-up on the regular medical 

floor.  She is currently sitting up at the bedside.  Awake and alert in no acute

distress.  She feels she is breathing a bit better today compared to yesterday. 

She does still require 60 L and 90% FiO2 on the AirVo high flow oxygen device 

along with a nonrebreather mask.  She is noted to have some increased edema of 

the lower extremities.  She has basilar crackles and bronchial breath sounds 

noted.  She remains on Lovenox, IV Solu-Medrol, and vitamin supplements.





Patient was reevaluated today on 12/20/20, this is shortly after she arrived to 

the ICU this morning, apparently her clinical status deteriorated this morning, 

patient could barely oxygenates and saturate properly on 100% FiO2, and BiPAP.  

ABG showed a pO2 of 49 pCO2 of 53 pH of 7.49, patient received Lasix, and after 

we were made aware of her ABG, I recommended transferring the patient to the 

ICU.  Shortly after she arrived, patient had lines placed including a triple-

lumen catheter, and a right radial arterial line, and recommended to monitor the

patient closely in the ICU.  Patient was responding well to diuretics, and I 

felt that she may do well without having to intubate the patient.  However few 

hours later patient decompensated further, and became more tachypneic, d

esaturated further, and recommended intubation and mechanical ventilation.  

Patient was intubated by CRNA, and shortly after she was intubated she was 

hypotensive and I recommended fluid boluses again and norepinephrine to be 

titrated accordingly.  Before intubation, patient was on BiPAP with IPAP of 16 

EPAP of 6 on the percent FiO2.











Objective





- Vital Signs


Vital signs: 


                                   Vital Signs











Temp  97.9 F   12/20/20 12:00


 


Pulse  112 H  12/20/20 14:00


 


Resp  31 H  12/20/20 14:00


 


BP  99/34   12/20/20 14:00


 


Pulse Ox  85 L  12/20/20 14:00








                                 Intake & Output











 12/19/20 12/20/20 12/20/20





 18:59 06:59 18:59


 


Intake Total 213  1052.268


 


Output Total   1160


 


Balance 213  -107.732


 


Intake:   


 


  IV   1050


 


    .9 KVO   50


 


    Sodium Chloride 0.9% 1,   1000





    000 ml @ 999 mls/hr IV .   





    Q1H1M ONE Rx#:[id number]   


 


  Intake, IV Titration   2.268





  Amount   


 


    Cisatracurium 200 mg In   2.268





    Sodium Chloride 0.9% 180   





    ml @ 1 MCG/KG/MIN 6.804   





    mls/hr IV .Q24H [facility] Rx#:   





    [id number]   


 


  Blood Product 213  


 


    Ffp Pher Conval Covid19 213  





    Acda 1  Unit   





    [id number]   


 


Output:   


 


  Urine   1160


 


Other:   


 


  Voiding Method  Bedside Commode Indwelling Catheter


 


  # Voids 3 3 1


 


  # Bowel Movements 1  








                       ABP, PAP, CO, CI - Last Documented











Arterial Blood Pressure        83/47

















- Exam


GENERAL EXAM: Revealed a 65-year-old female obese, in moderate respiratory 

distress, on BiPAP.


HEAD: Normocephalic.


EYES: PERRLA, EOMI, nonicteric.


NOSE: Clear with pink turbinates.


THROAT: Moist mucous membranes, clear.


NECK: Short obese neck, No masses, no JVD.


CHEST: No chest wall deformity.


LUNGS: Bibasilar crackles and rhonchi noted bilaterally.


CVS: Distant S1 and S2, no S3 gallop, no murmur.


ABDOMEN: Obese soft nontender no megaly no rebound.


SPINE: No scoliosis or deformity


SKIN: No rashes


CENTRAL NERVOUS SYSTEM: Alert and oriented 3, no gross focal neurologic 

deficit.


Psychiatric: Normal mood, affect and normal mental status examination.


EXTREMITIES: There is 1-2+ peripheral edema.  No clubbing, no cyanosis.  

Peripheral pulses are intact.








- Labs


CBC & Chem 7: 


                                 12/20/20 11:00





                                 12/20/20 11:00


Labs: 


                  Abnormal Lab Results - Last 24 Hours (Table)











  12/20/20 12/20/20 12/20/20 Range/Units





  08:02 08:02 09:44 


 


WBC  18.2 H    (3.8-10.6)  k/uL


 


Plt Count     (150-450)  k/uL


 


Neutrophils #  17.3 H    (1.3-7.7)  k/uL


 


Lymphocytes #  0.3 L    (1.0-4.8)  k/uL


 


D-Dimer     (<0.60)  mg/L FEU


 


ABG pH    7.50 H  (7.35-7.45)  


 


ABG pCO2    53 H  (35-45)  mmHg


 


ABG pO2    50 L*  ()  mmHg


 


ABG HCO3    41 H*  (21-25)  mmol/L


 


ABG Total CO2    43 H  (19-24)  mmol/L


 


ABG O2 Saturation    86.4 L  (94-97)  %


 


Sodium     (137-145)  mmol/L


 


Potassium     (3.5-5.1)  mmol/L


 


Chloride   95 L   ()  mmol/L


 


Carbon Dioxide   37.0 H   (21.6-31.8)  mmol/L


 


BUN     (7-17)  mg/dL


 


Creatinine   0.5 L   (0.6-1.5)  mg/dL


 


BUN/Creatinine Ratio   42.00 H   (12.00-20.00)  Ratio


 


Glucose   211 H   ()  mg/dL


 


POC Glucose (mg/dL)     (75-99)  mg/dL


 


AST     (14-36)  U/L


 


ALT   58 H   (8-44)  U/L


 


Total Protein   5.4 L   (6.2-8.2)  g/dL


 


Albumin   3.50 L   (3.80-4.90)  g/dL














  12/20/20 12/20/20 12/20/20 Range/Units





  11:00 11:00 11:00 


 


WBC  18.6 H    (3.8-10.6)  k/uL


 


Plt Count  451 H    (150-450)  k/uL


 


Neutrophils #  17.7 H    (1.3-7.7)  k/uL


 


Lymphocytes #  0.2 L    (1.0-4.8)  k/uL


 


D-Dimer    1.40 H  (<0.60)  mg/L FEU


 


ABG pH     (7.35-7.45)  


 


ABG pCO2     (35-45)  mmHg


 


ABG pO2     ()  mmHg


 


ABG HCO3     (21-25)  mmol/L


 


ABG Total CO2     (19-24)  mmol/L


 


ABG O2 Saturation     (94-97)  %


 


Sodium   135 L   (137-145)  mmol/L


 


Potassium   3.4 L   (3.5-5.1)  mmol/L


 


Chloride   88 L   ()  mmol/L


 


Carbon Dioxide   42 H*   (21.6-31.8)  mmol/L


 


BUN   19 H   (7-17)  mg/dL


 


Creatinine     (0.6-1.5)  mg/dL


 


BUN/Creatinine Ratio     (12.00-20.00)  Ratio


 


Glucose   241 H   ()  mg/dL


 


POC Glucose (mg/dL)     (75-99)  mg/dL


 


AST   47 H   (14-36)  U/L


 


ALT   69 H   (8-44)  U/L


 


Total Protein   6.0 L   (6.2-8.2)  g/dL


 


Albumin   3.2 L   (3.80-4.90)  g/dL














  12/20/20 12/20/20 Range/Units





  12:03 15:06 


 


WBC    (3.8-10.6)  k/uL


 


Plt Count    (150-450)  k/uL


 


Neutrophils #    (1.3-7.7)  k/uL


 


Lymphocytes #    (1.0-4.8)  k/uL


 


D-Dimer    (<0.60)  mg/L FEU


 


ABG pH    (7.35-7.45)  


 


ABG pCO2   68 H  (35-45)  mmHg


 


ABG pO2   68 L  ()  mmHg


 


ABG HCO3   37 H  (21-25)  mmol/L


 


ABG Total CO2   39 H  (19-24)  mmol/L


 


ABG O2 Saturation   91.5 L  (94-97)  %


 


Sodium    (137-145)  mmol/L


 


Potassium    (3.5-5.1)  mmol/L


 


Chloride    ()  mmol/L


 


Carbon Dioxide    (21.6-31.8)  mmol/L


 


BUN    (7-17)  mg/dL


 


Creatinine    (0.6-1.5)  mg/dL


 


BUN/Creatinine Ratio    (12.00-20.00)  Ratio


 


Glucose    ()  mg/dL


 


POC Glucose (mg/dL)  227 H   (75-99)  mg/dL


 


AST    (14-36)  U/L


 


ALT    (8-44)  U/L


 


Total Protein    (6.2-8.2)  g/dL


 


Albumin    (3.80-4.90)  g/dL














Assessment and Plan


Assessment: 





Impression:


Acute hypoxic respiratory failure secondary to covid 19 pneumonitis.  And ARDS. 

Patient required intubation and mechanical ventilation few hours after she was 

monitored in the ICU.  Patient completed her remdesivir, received 2 units of 

convalescent plasma, on Lovenox, on IV Solu-Medrol, and for some reason patient 

could not improve significantly and she was all along on relatively high FiO2 

while on the medical floor, could not be titrated down, she eventually 

decompensated and required intubation and mechanical ventilation.  Patient was 

intubated today on 12/20/20.


Acute Covid 19 pneumonitis.


Chronic back pain.


Morbid obesity.


Chronic peripheral edema, suspect chronic pulmonary hypertension.  And cor 

pulmonale.





Recommendation:


Continue ventilatory support.


Continue norepinephrine and titrate accordingly.


Continue GI and DVT prophylaxis.


Continue Lovenox.


Nutritional support via enteral feeding.


Continue methylprednisolone.


Continue to Covid 19th cocktails.  Patient did receive full dose of remdesivir, 

and 2 units of convalescent plasma already.


Prognosis is poor and guarded, we'll continue to follow.


Critical care time is 35 minutes not including the time spent on procedures.





Time with Patient: Greater than 30

## 2020-12-20 NOTE — PCN
PROCEDURE NOTE



PROCEDURE PERFORMED:

Placement of a right radial arterial line.



PREOPERATIVE DIAGNOSIS:

Acute hypoxic respiratory failure.



POSTOPERATIVE DIAGNOSIS:

Acute hypoxic respiratory failure.



ANESTHESIA:

None deployed.



PROCEDURE:

The right wrist was prepared in a sterile fashion and drapes were applied.  The right

radial artery was palpated, cannulated, a guidewire was placed.  A Cook catheter was

inserted over the guidewire, and the guidewire was removed.  Good blood flow noted and

good waveform noted, no evidence of any immediate complications.  The line was secured

using 3.0 silk sutures.





MMODL / IJN: [id number] / Job#: [id number]

## 2020-12-20 NOTE — XR
EXAMINATION TYPE: XR chest 1V portable

 

DATE OF EXAM: 12/20/2020

 

COMPARISON: Today

 

HISTORY: Hypoxemia

 

TECHNIQUE:

 

FINDINGS: Endotracheal tube is 5 cm from the sulema. There is diffuse airspace infiltrate in the left
 lung. There is coarse interstitial infiltrate in the right lung. There are chest leads. There is sherine
ogastric tube with the tip in the stomach.

 

IMPRESSION: Tubing in good position. There are pulmonary infiltrates more on the left side unchanged 
compared to exam 3 hours ago.

## 2020-12-20 NOTE — XR
EXAMINATION TYPE: XR chest 1V portable

 

DATE OF EXAM: 12/20/2020

 

COMPARISON: Today

 

HISTORY: Hypoxemia

 

TECHNIQUE:

 

FINDINGS: Endotracheal tube is 4.5 cm from the sulema. There is nasogastric tube in the stomach there
 are chest leads. There is diffuse airspace consolidation in the left lung. There is diffuse pulmonar
y interstitial and airspace edema bilaterally. The bony thorax is intact

 

IMPRESSION: Left side pulmonary consolidation. Diffuse pulmonary edema. Chest appears the same or sli
ghtly worse than exam 3 hours ago.

## 2020-12-20 NOTE — PCN
PROCEDURE NOTE



PROCEDURE PERFORMED:

Placement of the right femoral triple-lumen catheter.



PREOP DIAGNOSIS:

Acute hypoxic respiratory failure.



POSTOPERATIVE DIAGNOSIS:

Acute hypoxic respiratory failure.



ANESTHESIA USED:

2 mL of 1% lidocaine.



PROCEDURE DETAILS:

The patient was placed in the supine position, the right groin was prepared in a

sterile fashion and drapes were applied.  The right femoral artery was palpated, and

medial to the femoral artery, I cannulated the right femoral vein, and a guidewire was

placed.  The area around the guidewire was dilated.  Then a triple-lumen catheter was

inserted over the guidewire, the guidewire was removed.  Good flow was noted in the 3

different ports of the triple-lumen catheter.  The line was secured using 3.0 silk

sutures.  No evidence of any immediate complications.





MMODL / IJN: [id number] / Job#: [id number]

## 2020-12-20 NOTE — XR
EXAMINATION TYPE: XR chest 1V portable

 

DATE OF EXAM: 12/20/2020

 

COMPARISON: Today

 

HISTORY: Respiratory failure.

 

TECHNIQUE:

 

FINDINGS: Endotracheal tube is 4 cm from the sulema. There is diffuse pulmonary interstitial and airs
pace edema. Nasogastric tube is in the distal esophagus. Heart size is fairly normal. Trachea is midl
ine.

 

IMPRESSION: Endotracheal tube in good position. Pulmonary edema unchanged. Nasogastric tube is in the
 lower esophagus.

## 2020-12-20 NOTE — P.PN
Subjective


Progress Note Date: 12/20/20


Principal diagnosis: 





Patient seen and examined at bedside.  Patient's respiratory status has 

decompensated today.  Patient is actively being intubated in the ICU.  Patient 

is alert and awake before sedation was provided.


Patient seen and examined at bedside.  Patient's respiratory status has 

decompensated today.  Patient is actively being intubated in the ICU.  Patient 

is alert and awake before sedation was provided.








Objective





- Vital Signs


Vital signs: 


                                   Vital Signs











Temp  97.9 F   12/20/20 12:00


 


Pulse  112 H  12/20/20 14:00


 


Resp  31 H  12/20/20 14:00


 


BP  99/34   12/20/20 14:00


 


Pulse Ox  85 L  12/20/20 14:00








                                 Intake & Output











 12/19/20 12/20/20 12/20/20





 18:59 06:59 18:59


 


Intake Total 213  1052.268


 


Output Total   1160


 


Balance 213  -107.732


 


Intake:   


 


  IV   1050


 


    .9 KVO   50


 


    Sodium Chloride 0.9% 1,   1000





    000 ml @ 999 mls/hr IV .   





    Q1H1M ONE Rx#:[id number]   


 


  Intake, IV Titration   2.268





  Amount   


 


    Cisatracurium 200 mg In   2.268





    Sodium Chloride 0.9% 180   





    ml @ 1 MCG/KG/MIN 6.804   





    mls/hr IV .Q24H [facility] Rx#:   





    [id number]   


 


  Blood Product 213  


 


    Ffp Pher Conval Covid19 213  





    Acda 1  Unit   





    [id number]   


 


Output:   


 


  Urine   1160


 


Other:   


 


  Voiding Method  Bedside Commode Indwelling Catheter


 


  # Voids 3 3 1


 


  # Bowel Movements 1  








                       ABP, PAP, CO, CI - Last Documented











Arterial Blood Pressure        83/47

















- Exam





General: [toxic], [acute respiratory distress], [appears at stated age]


Derm: [warm], [dry]


Head: [atraumatic], [normocephalic], [symmetric]


Eyes: [EOMI], [no lid lag], [anicteric sclera]


Mouth: [no lip lesion], [mucus membranes moist]


Cardiovascular: [S1S2 reg], [no murmur], [positive posterior tibial pulse 

bilateral], 


Lungs: [Diminished bilateral], [no rhonchi, no rales] , [no accessory muscle 

use]


Abdominal: [soft], [ nontender to palpation], [no guarding], [no appreciable 

organomegaly]


Ext: [no gross muscle atrophy], [no edema], [no contractures]


Neuro: [ CN II-XI grossly intact], [no focal neuro deficits]


Psych: [Alert], [oriented], [appropriate affect] 





- Labs


CBC & Chem 7: 


                                 12/20/20 11:00





                                 12/20/20 11:00


Labs: 


                  Abnormal Lab Results - Last 24 Hours (Table)











  12/20/20 12/20/20 12/20/20 Range/Units





  08:02 08:02 09:44 


 


WBC  18.2 H    (3.8-10.6)  k/uL


 


Plt Count     (150-450)  k/uL


 


Neutrophils #  17.3 H    (1.3-7.7)  k/uL


 


Lymphocytes #  0.3 L    (1.0-4.8)  k/uL


 


D-Dimer     (<0.60)  mg/L FEU


 


ABG pH    7.50 H  (7.35-7.45)  


 


ABG pCO2    53 H  (35-45)  mmHg


 


ABG pO2    50 L*  ()  mmHg


 


ABG HCO3    41 H*  (21-25)  mmol/L


 


ABG Total CO2    43 H  (19-24)  mmol/L


 


ABG O2 Saturation    86.4 L  (94-97)  %


 


Sodium     (137-145)  mmol/L


 


Potassium     (3.5-5.1)  mmol/L


 


Chloride   95 L   ()  mmol/L


 


Carbon Dioxide   37.0 H   (21.6-31.8)  mmol/L


 


BUN     (7-17)  mg/dL


 


Creatinine   0.5 L   (0.6-1.5)  mg/dL


 


BUN/Creatinine Ratio   42.00 H   (12.00-20.00)  Ratio


 


Glucose   211 H   ()  mg/dL


 


POC Glucose (mg/dL)     (75-99)  mg/dL


 


AST     (14-36)  U/L


 


ALT   58 H   (8-44)  U/L


 


Total Protein   5.4 L   (6.2-8.2)  g/dL


 


Albumin   3.50 L   (3.80-4.90)  g/dL














  12/20/20 12/20/20 12/20/20 Range/Units





  11:00 11:00 11:00 


 


WBC  18.6 H    (3.8-10.6)  k/uL


 


Plt Count  451 H    (150-450)  k/uL


 


Neutrophils #  17.7 H    (1.3-7.7)  k/uL


 


Lymphocytes #  0.2 L    (1.0-4.8)  k/uL


 


D-Dimer    1.40 H  (<0.60)  mg/L FEU


 


ABG pH     (7.35-7.45)  


 


ABG pCO2     (35-45)  mmHg


 


ABG pO2     ()  mmHg


 


ABG HCO3     (21-25)  mmol/L


 


ABG Total CO2     (19-24)  mmol/L


 


ABG O2 Saturation     (94-97)  %


 


Sodium   135 L   (137-145)  mmol/L


 


Potassium   3.4 L   (3.5-5.1)  mmol/L


 


Chloride   88 L   ()  mmol/L


 


Carbon Dioxide   42 H*   (21.6-31.8)  mmol/L


 


BUN   19 H   (7-17)  mg/dL


 


Creatinine     (0.6-1.5)  mg/dL


 


BUN/Creatinine Ratio     (12.00-20.00)  Ratio


 


Glucose   241 H   ()  mg/dL


 


POC Glucose (mg/dL)     (75-99)  mg/dL


 


AST   47 H   (14-36)  U/L


 


ALT   69 H   (8-44)  U/L


 


Total Protein   6.0 L   (6.2-8.2)  g/dL


 


Albumin   3.2 L   (3.80-4.90)  g/dL














  12/20/20 Range/Units





  12:03 


 


WBC   (3.8-10.6)  k/uL


 


Plt Count   (150-450)  k/uL


 


Neutrophils #   (1.3-7.7)  k/uL


 


Lymphocytes #   (1.0-4.8)  k/uL


 


D-Dimer   (<0.60)  mg/L FEU


 


ABG pH   (7.35-7.45)  


 


ABG pCO2   (35-45)  mmHg


 


ABG pO2   ()  mmHg


 


ABG HCO3   (21-25)  mmol/L


 


ABG Total CO2   (19-24)  mmol/L


 


ABG O2 Saturation   (94-97)  %


 


Sodium   (137-145)  mmol/L


 


Potassium   (3.5-5.1)  mmol/L


 


Chloride   ()  mmol/L


 


Carbon Dioxide   (21.6-31.8)  mmol/L


 


BUN   (7-17)  mg/dL


 


Creatinine   (0.6-1.5)  mg/dL


 


BUN/Creatinine Ratio   (12.00-20.00)  Ratio


 


Glucose   ()  mg/dL


 


POC Glucose (mg/dL)  227 H  (75-99)  mg/dL


 


AST   (14-36)  U/L


 


ALT   (8-44)  U/L


 


Total Protein   (6.2-8.2)  g/dL


 


Albumin   (3.80-4.90)  g/dL














Assessment and Plan


Assessment: 





Ventilator dependent Acute hypoxic respiratory failure secondary to COV ID 19 

pneumonia


Vent management per ICU


Pulmonary recommendations appreciated


Methylprednisolone 60 every 6 IV push





Hypothyroid


Continue levothyroxine








Obesity





Chronic back pain





GI and DVT prophylaxis





A.m. labs

## 2020-12-20 NOTE — XR
EXAMINATION TYPE: XR chest 1V portable

 

DATE OF EXAM: 12/20/2020

 

HISTORY: Shortness of breath.

 

COMPARISON: 12/18/2020

 

TECHNIQUE: Single view of the chest is submitted.

 

FINDINGS:

Demonstrated are scattered senescent parenchymal change.  

 

Diffuse bilateral airspace infiltrates persist without significant change.

 

The heart is stable.

 

Hilar and mediastinal structures are within normal limits.  

 

Degenerative changes are seen of the dorsal spine. 

 

 IMPRESSION: 

 

1.  Diffuse bilateral airspace infiltrates persist without significant change.

## 2020-12-21 VITALS — HEART RATE: 140 BPM

## 2020-12-21 LAB — GLUCOSE BLD-MCNC: 147 MG/DL (ref 75–99)

## 2020-12-21 PROCEDURE — 3E033XZ INTRODUCTION OF VASOPRESSOR INTO PERIPHERAL VEIN, PERCUTANEOUS APPROACH: ICD-10-PCS

## 2020-12-21 PROCEDURE — 5A12012 PERFORMANCE OF CARDIAC OUTPUT, SINGLE, MANUAL: ICD-10-PCS

## 2020-12-21 NOTE — P.EN
Code blue team note





Activated at 00:07.  Arrived on the scene shortly after. The patient was 

undergoing CPR. Discussed the case with the RN who noted that the patient who is

COVID positive had been on maximal non-invasive support on the medical floor and

was transferred to the MICU earlier today due to worsening hypoxia. She was 

intubated in the MICU and was also started on levophed and vasopressin 

infusions. The patient subsequently lost pulse and Code blue was activated. The 

patient was noted to be in PEA and was given IVP Epinephrine x 3, Calcium 

chloride x 1, sodium bicarb x 1. The patient had ROSC at 00:19. Discussed the 

case in detail with the patient's  (Kaleb) and daughter (Felicia) in 

person. Discussed the patient's critical condition and her poor overall progn

osis. They noted that "she would not have wanted to keep going like this". They 

requested for no further CPR. The patient was subsequently made No-Code. They 

wished for all other management to continue at this time. They understood that 

the patient will likely pass in the next few hours. Consoled the family members 

and answered questions. Primary team and intensivists notified by the RN.

## 2020-12-21 NOTE — PN
PROGRESS NOTE



DATE OF SERVICE:

12/20/2020



REASON FOR FOLLOWUP:

Pneumonia.



INTERVAL HISTORY:

The patient did have worsening of her respiratory status.  Patient did end up getting

intubated this afternoon. Patient is currently on 100% FiO2 and she is also hypotensive

requiring pressor support. At the time of evaluation, no significant purulent secretion

was noticed by nursing staff and no other changes.  The patient is not _____ and is

unable to provide any history.



PHYSICAL EXAMINATION:

Blood pressure is 89/54 with a pulse of 135, temperature 98. She is currently 87% on

100% FiO2.

General description is a middle-aged female intubated on the vent.

RESPIRATORY SYSTEM: Unlabored breathing with decreased intensity of breath sounds. No

wheeze.

HEART: S1, S2.  Regular rate and rhythm.

ABDOMEN:  Soft, no tenderness.

EXTREMITIES:  No edema of the feet.



LABS:

Hemoglobin is 13.8, white count 2.0.



DIAGNOSTIC IMPRESSION AND PLAN:

Patient with acute respiratory failure which is multifactorial in this patient who did

have acute COVID-19 pneumonia complete her remdesivir therapy, now with significant

worsening possible ARDS.  However, significant hypotension underlying pneumonia,

bacterial pneumonia not entirely excluded, will obtain a sputum, blood cultures.

Empirically add cefepime and vancomycin and monitor clinical course closely.  Overall

prognosis remains to be guarded.





MMODL / IJN: [id number] / Job#: [id number]

## 2020-12-22 NOTE — P.DS
Providers


Date of admission: 


20 19:11





Expected date of discharge: 20


Attending physician: 


Cailin Beal DO





Consults: 





                                        





20 17:39


Consult Physician Routine 


   Consulting Provider: Mya Garcia


   Consult Reason/Comments: covid +, do we need remdesivir?, 8L 90%


   Do you want consulting provider notified?: Yes





20 23:39


Consult Physician Routine 


   Consulting Provider: Jalil Fairchild


   Consult Reason/Comments: covid/hypoxia


   Do you want consulting provider notified?: Yes











Primary care physician: 


Stan Zayas MD





Hospital Course: 





Ventilator dependent Acute hypoxic respiratory failure secondary to COV ID 19 

pneumonia


Vent management per ICU


Pulmonary recommendations appreciated


Methylprednisolone 60 every 6 IV push





Pt was admitted for the above, but unfortunately MARCO BOOTH called overnight with

ROSC, however, upon further GoC discussion with family, patient was converted to

comfort measures, and passed away overnight.  Pt not seen on rounds today.











Plan - Discharge Summary


Discharge Rx Participant: No


New Discharge Prescriptions: 


Discontinued


   Phentermine HCl [Adipex-P] 37.5 mg PO [id number]


   Levothyroxine Sodium 100 mcg PO DAILY


   Ibuprofen [Motrin] 800 mg PO TID


   Hydroxychloroquine Sulfate [Plaquenil] See Taper PO AS DIRECTED


   buPROPion XL [Wellbutrin Xl] 150 mg PO DAILY


   Dexamethasone [Decadron] 6 mg PO DAILY


   Gabapentin 300 mg PO BID


   guaiFENesin [Mucinex] 1,200 mg PO BID


Follow up Appointment(s)/Referral(s): 


Stan Zayas MD [Primary Care Provider] - As Needed (No referral needed, 

patient .)


Discharge Disposition: 





- Preliminary Cause of Death


Preliminary Cause of Death: Respiratory Failure

## 2020-12-28 NOTE — CDI
Documentation Clarification Form



Date: 2020 10:51:20 AM

From: Velvet TuckerMurciaYVON koroma, CCDS

Phone:  [phone]

MRN: [id number]

Admit Date: 2020 07:11:00 PM

Patient Name: Jovita Navarrete

Visit Number: [id number]

Discharge Date:  2020 02:30:00 AM





ATTENTION: The Clinical Documentation Specialists (CDI) and [facility] Coding Staff 
appreciate your assistance in clarifying documentation. Please respond to the 
clarification below the line at the bottom and electronically sign. The CDI & 
[facility] Coding staff will review the response and follow-up if needed. Please note: 
Queries are made part of the Legal Health Record. If you have any questions, 
please contact the author of this message via ITS.



Dr. Omid Jeronimo:



The patient was admitted on  with COVID 19 pneumonia, her respiratory status
declined and she went into PEA on  requiring CPR, was made comfort measures
by her family and subsequently  on . 

Per the  Pulmonary Progress Note: "Patient was intubated by CRNA, and 
shortly after she was intubated she was hypotensive and I recommended fluid 
boluses again and norepinephrine to be titrated accordingly."

Per the  Infectious Disease Progress Note: "Patient is currently on 100% 
FiO2 and she is also hypotensive requiring pressor support."



Patient history/risk factors: Morbid Obesity w/BMI 41.6, Herniated Discs nos, 
Hypothyroidism, Osteoarthritis. Non smoker.

Clinical Indicators:  Presented to the ED on  with cough, weakness, SOB, 
fever, chills, decreased appetite, decreased taste & smell, headaches & 
diarrhea. Diagnosed with COVID 19 Pneumonia and Acute Hypoxic Respiratory 
Failure.

Vitals : T 98.4, P 84, R 20 (sob), /83, PO 88 RA - 95 4Lnc - 90 6Lnc

 PO 74 on 8L nc advanced to 15L high flow & nrb

12/15 R 26 (SOB), B 171/73, PO 93 on BiPAP

: T 112^, P R 31^, BP 99/34*, PO 85 (intubated/vent)



Treatment : INH Ventolin, IV fluid bolus 1,000 mls @ 999 mls/hr, IV 
Rocephin, IV Decadron, Lovenox sq, IV Solumedrol, Vit C, Vit D3, Orazinc.

: IV Remdesivir.

 &  Received Convalescent Plasma

: Intubated, 100% ventilation, IV Kcl, IV Propofol, IV fluid boluses x2, IV
levophed, IV NaBicarb. 

 CPR for PEA.



In your professional opinion, can you please clarify if the patient was also 
treated for: 



    Septic Shock

      o Please clarify if Sepsis with or without Severe Sepsis is present & POA:
yes or no

      o Suspected or known causative organism_________

      o Any associated organ failure___________________

    Cardiogenic Shock

      o Cause______________

    Hypovolemic Shock

      o   Cause_____________   

    Other, please specify_________

    Unable to determine





(Last Revision: 2017)

___________________________________________________________________________

Unable to determine

MTDD